# Patient Record
Sex: FEMALE | Race: WHITE | NOT HISPANIC OR LATINO | Employment: OTHER | URBAN - METROPOLITAN AREA
[De-identification: names, ages, dates, MRNs, and addresses within clinical notes are randomized per-mention and may not be internally consistent; named-entity substitution may affect disease eponyms.]

---

## 2023-09-13 PROBLEM — E78.00 ELEVATED CHOLESTEROL: Status: ACTIVE | Noted: 2023-09-13

## 2023-09-13 PROBLEM — R92.8 ABNORMAL MAMMOGRAM: Status: ACTIVE | Noted: 2023-09-13

## 2023-09-13 PROBLEM — S42.309A FRACTURE OF HUMERUS: Status: ACTIVE | Noted: 2023-09-13

## 2023-09-13 PROBLEM — I10 BENIGN ESSENTIAL HYPERTENSION: Status: ACTIVE | Noted: 2023-09-13

## 2023-09-13 PROBLEM — F41.8 DEPRESSION WITH ANXIETY: Status: ACTIVE | Noted: 2023-09-13

## 2023-09-13 PROBLEM — R73.03 PREDIABETES: Status: ACTIVE | Noted: 2023-09-13

## 2023-09-13 PROBLEM — M19.90 ARTHRITIS: Status: ACTIVE | Noted: 2023-09-13

## 2023-09-13 PROBLEM — E87.8 FLUID VOLUME DISORDER: Status: ACTIVE | Noted: 2023-09-13

## 2023-09-13 PROBLEM — S49.90XA INJURY OF UPPER EXTREMITY: Status: ACTIVE | Noted: 2023-09-13

## 2023-09-13 PROBLEM — R73.01 ELEVATED FASTING GLUCOSE: Status: ACTIVE | Noted: 2023-09-13

## 2023-09-13 RX ORDER — LYSINE HCL 500 MG
TABLET ORAL
COMMUNITY
Start: 2014-06-18 | End: 2024-05-14 | Stop reason: WASHOUT

## 2023-09-13 RX ORDER — NICOTINE POLACRILEX 2 MG
GUM BUCCAL
COMMUNITY
Start: 2014-06-18 | End: 2024-05-14 | Stop reason: WASHOUT

## 2023-09-13 RX ORDER — NAPROXEN SODIUM 220 MG
220 TABLET ORAL EVERY 12 HOURS
COMMUNITY

## 2023-09-13 RX ORDER — HYDROCODONE BITARTRATE AND ACETAMINOPHEN 5; 325 MG/1; MG/1
1-2 TABLET ORAL EVERY 4 HOURS PRN
COMMUNITY
End: 2024-05-14 | Stop reason: WASHOUT

## 2023-09-13 RX ORDER — LISINOPRIL AND HYDROCHLOROTHIAZIDE 10; 12.5 MG/1; MG/1
1 TABLET ORAL DAILY
COMMUNITY
Start: 2016-01-08 | End: 2023-11-06 | Stop reason: SDUPTHER

## 2023-09-13 RX ORDER — CITALOPRAM 20 MG/1
20 TABLET, FILM COATED ORAL DAILY
COMMUNITY
Start: 2013-02-07

## 2023-09-13 RX ORDER — LISINOPRIL AND HYDROCHLOROTHIAZIDE 12.5; 2 MG/1; MG/1
1 TABLET ORAL NIGHTLY
COMMUNITY
End: 2023-11-06 | Stop reason: ALTCHOICE

## 2023-09-13 RX ORDER — MELOXICAM 15 MG/1
15 TABLET ORAL DAILY
COMMUNITY
Start: 2015-07-10 | End: 2023-10-09 | Stop reason: ALTCHOICE

## 2023-09-13 RX ORDER — ACETAMINOPHEN, DIPHENHYDRAMINE HCL, PHENYLEPHRINE HCL 325; 25; 5 MG/1; MG/1; MG/1
TABLET ORAL
COMMUNITY
End: 2024-05-14 | Stop reason: WASHOUT

## 2023-09-13 RX ORDER — ATORVASTATIN CALCIUM 20 MG/1
20 TABLET, FILM COATED ORAL DAILY
COMMUNITY
Start: 2022-02-22 | End: 2023-10-09 | Stop reason: ALTCHOICE

## 2023-09-13 RX ORDER — DEXTROMETHORPHAN HYDROBROMIDE, GUAIFENESIN 5; 100 MG/5ML; MG/5ML
1300 LIQUID ORAL EVERY 8 HOURS PRN
COMMUNITY

## 2023-09-13 RX ORDER — TRIAMCINOLONE ACETONIDE 55 UG/1
2 SPRAY, METERED NASAL DAILY
COMMUNITY
Start: 2015-01-24

## 2023-10-04 ENCOUNTER — OFFICE VISIT (OUTPATIENT)
Dept: PRIMARY CARE | Facility: CLINIC | Age: 73
End: 2023-10-04
Payer: MEDICARE

## 2023-10-04 VITALS
SYSTOLIC BLOOD PRESSURE: 120 MMHG | OXYGEN SATURATION: 97 % | HEIGHT: 56 IN | RESPIRATION RATE: 18 BRPM | WEIGHT: 155 LBS | BODY MASS INDEX: 34.87 KG/M2 | HEART RATE: 85 BPM | TEMPERATURE: 97.9 F | DIASTOLIC BLOOD PRESSURE: 64 MMHG

## 2023-10-04 DIAGNOSIS — E78.00 ELEVATED CHOLESTEROL: ICD-10-CM

## 2023-10-04 DIAGNOSIS — J06.9 ACUTE URI: Primary | ICD-10-CM

## 2023-10-04 DIAGNOSIS — R73.03 PREDIABETES: ICD-10-CM

## 2023-10-04 DIAGNOSIS — I10 BENIGN ESSENTIAL HYPERTENSION: ICD-10-CM

## 2023-10-04 LAB — POC HEMOGLOBIN A1C: 5.4 % (ref 4.2–6.5)

## 2023-10-04 PROCEDURE — 99214 OFFICE O/P EST MOD 30 MIN: CPT | Performed by: FAMILY MEDICINE

## 2023-10-04 PROCEDURE — 1160F RVW MEDS BY RX/DR IN RCRD: CPT | Performed by: FAMILY MEDICINE

## 2023-10-04 PROCEDURE — 3074F SYST BP LT 130 MM HG: CPT | Performed by: FAMILY MEDICINE

## 2023-10-04 PROCEDURE — 1159F MED LIST DOCD IN RCRD: CPT | Performed by: FAMILY MEDICINE

## 2023-10-04 PROCEDURE — 3078F DIAST BP <80 MM HG: CPT | Performed by: FAMILY MEDICINE

## 2023-10-04 PROCEDURE — 1126F AMNT PAIN NOTED NONE PRSNT: CPT | Performed by: FAMILY MEDICINE

## 2023-10-04 PROCEDURE — 1036F TOBACCO NON-USER: CPT | Performed by: FAMILY MEDICINE

## 2023-10-04 RX ORDER — BENZONATATE 100 MG/1
100 CAPSULE ORAL 3 TIMES DAILY PRN
Qty: 30 CAPSULE | Refills: 0 | Status: SHIPPED | OUTPATIENT
Start: 2023-10-04 | End: 2023-10-14

## 2023-10-04 ASSESSMENT — PATIENT HEALTH QUESTIONNAIRE - PHQ9
SUM OF ALL RESPONSES TO PHQ9 QUESTIONS 1 & 2: 0
1. LITTLE INTEREST OR PLEASURE IN DOING THINGS: NOT AT ALL
2. FEELING DOWN, DEPRESSED OR HOPELESS: NOT AT ALL

## 2023-10-04 ASSESSMENT — PAIN SCALES - GENERAL: PAINLEVEL: 0-NO PAIN

## 2023-10-06 NOTE — PROGRESS NOTES
Subjective       Patient ID: Mackenzie Acosta is a 73 y.o. female here for f/u PreDM, HTN, and elevated cholesterol. Denies CP, SOB, or edema. Currently watching carbs. BS has not been checked lately. BP is good at home. She has had URI sx's x a month starting with sinus sx's then it went down into her chest with a dry cough all day. She developed laryngitis over the past two days. She went to  on the first day, 23, and they advised her it was viral and they recommended supportive care.    Active Ambulatory Problems     Diagnosis Date Noted    Abnormal mammogram 2023    Arthritis 2023    Benign essential hypertension 2023    Depression with anxiety 2023    Elevated cholesterol 2023    Elevated fasting glucose 2023    Fluid volume disorder 2023    Fracture of humerus 2023    Injury of upper extremity 2023    Prediabetes 2023     Resolved Ambulatory Problems     Diagnosis Date Noted    No Resolved Ambulatory Problems     Past Medical History:   Diagnosis Date    Anxiety     Cataract     Depression     Hypertension     Pre-diabetes        Past Surgical History:   Procedure Laterality Date    CATARACT EXTRACTION  2014    Cataract Surgery    HYSTEROSCOPY  2016    Hysteroscopy With Endometrial Ablation    OTHER SURGICAL HISTORY  2014    Vitrectomy Retinal Inspect: No Open Retinal Breaks / Detach    OTHER SURGICAL HISTORY  07/10/2015    Open Treatment Of Humeral Shaft Fracture With Plate/Screws    TONSILLECTOMY  2014    Tonsillectomy       No relevant family history has been documented for this patient.    She indicated that the status of her mother is unknown. She indicated that her father is . She indicated that her sister is . She indicated that one of her three brothers is .      Social History     Tobacco Use   Smoking Status Never   Smokeless Tobacco Never         Vitals:    10/04/23 0854   BP: 120/64  "  Pulse: 85   Resp: 18   Temp: 36.6 °C (97.9 °F)   SpO2: 97%   Weight: 70.3 kg (155 lb)   Height: 1.429 m (4' 8.25\")   PainSc: 0-No pain     Body mass index is 34.44 kg/m².    Objective     HEENT: normal pharynx, nares, sinuses, and TMs  Neck: no LAD  Lungs: CTA  Heart: RRR  Abdomen: NABS, soft, NT  Barefoot Extremities: no c,c,e, good pulses           POC HEMOGLOBIN A1c   Date Value Ref Range Status   10/04/2023 5.4 4.2 - 6.5 % Final         Assessment/Plan   Diagnoses and all orders for this visit:  Acute URI  -     benzonatate (Tessalon) 100 mg capsule; Take 1 capsule (100 mg) by mouth 3 times a day as needed for cough for up to 10 days. Do not crush or chew.  -     I can give a MDP if not improving. Consider ATBx if bacterial sx's develop  Prediabetes  -     POCT glycosylated hemoglobin (Hb A1C) manually resulted  -     Follow Up In Primary Care - Health Maintenance; Future  Elevated cholesterol  Benign essential hypertension                "

## 2023-10-09 ENCOUNTER — TELEMEDICINE (OUTPATIENT)
Dept: PRIMARY CARE | Facility: CLINIC | Age: 73
End: 2023-10-09
Payer: MEDICARE

## 2023-10-09 DIAGNOSIS — J20.8 ACUTE BRONCHITIS DUE TO OTHER SPECIFIED ORGANISMS: Primary | ICD-10-CM

## 2023-10-09 PROCEDURE — 99213 OFFICE O/P EST LOW 20 MIN: CPT | Performed by: FAMILY MEDICINE

## 2023-10-09 PROCEDURE — 99213 OFFICE O/P EST LOW 20 MIN: CPT | Mod: 95 | Performed by: FAMILY MEDICINE

## 2023-10-09 RX ORDER — METHYLPREDNISOLONE 4 MG/1
TABLET ORAL
Qty: 21 TABLET | Refills: 0 | Status: SHIPPED | OUTPATIENT
Start: 2023-10-09 | End: 2023-10-16

## 2023-10-09 RX ORDER — AZITHROMYCIN 250 MG/1
TABLET, FILM COATED ORAL
Qty: 6 TABLET | Refills: 0 | Status: SHIPPED | OUTPATIENT
Start: 2023-10-09 | End: 2024-05-14 | Stop reason: WASHOUT

## 2023-10-09 ASSESSMENT — ENCOUNTER SYMPTOMS
MYALGIAS: 0
RHINORRHEA: 0
FEVER: 0
WEIGHT LOSS: 0
SWEATS: 0
WHEEZING: 1
COUGH: 1
HEARTBURN: 0
SORE THROAT: 1
CHILLS: 0
HEADACHES: 0
HEMOPTYSIS: 0

## 2023-10-09 NOTE — PROGRESS NOTES
Subjective   With patient's permission, this is a Telemedicine visit with video and audio. The provider and patient participated in this telemedicine encounter.   This is 72 y/o WF on telehealth visit for URI sx's worsening after a month with yellow sputum and some wheezing.  The Tessalon perrles did not stop the cough.       Objective   There were no vitals taken for this visit.    Physical Exam    Looks good on video, no respiratory distress  But sounds congested still      Diagnoses and all orders for this visit:  Acute bronchitis due to other specified organisms  -     azithromycin (Zithromax) 250 mg tablet; Take 2 tablets (500 mg) by mouth once daily for 1 day, THEN 1 tablet (250 mg) once daily for 4 days. Take 2 tabs (500 mg) by mouth today, than 1 daily for 4 days..  -     methylPREDNISolone (Medrol Dospak) 4 mg tablets; Take as directed on package.    Answers submitted by the patient for this visit:  Cough Questionnaire (Submitted on 10/9/2023)  Chief Complaint: Cough  Chronicity: recurrent  Onset: more than 1 month ago  Progression since onset: rapidly worsening  Frequency: every few minutes  Cough characteristics: productive of sputum, productive of brown sputum  chest pain: No  chills: No  ear congestion: No  ear pain: No  fever: No  headaches: No  heartburn: No  hemoptysis: No  myalgias: No  nasal congestion: Yes  postnasal drip: Yes  rash: No  rhinorrhea: No  sore throat: Yes  sweats: No  weight loss: No  wheezing: Yes  Risk factors for lung disease: occupational exposure

## 2023-11-06 ENCOUNTER — TELEPHONE (OUTPATIENT)
Dept: PRIMARY CARE | Facility: CLINIC | Age: 73
End: 2023-11-06
Payer: MEDICARE

## 2023-11-06 DIAGNOSIS — I10 BENIGN ESSENTIAL HYPERTENSION: Primary | ICD-10-CM

## 2023-11-06 RX ORDER — LISINOPRIL AND HYDROCHLOROTHIAZIDE 10; 12.5 MG/1; MG/1
1 TABLET ORAL DAILY
Qty: 90 TABLET | Refills: 3 | Status: SHIPPED | OUTPATIENT
Start: 2023-11-06

## 2024-04-25 ENCOUNTER — APPOINTMENT (OUTPATIENT)
Dept: PRIMARY CARE | Facility: CLINIC | Age: 74
End: 2024-04-25
Payer: MEDICARE

## 2024-05-14 ENCOUNTER — OFFICE VISIT (OUTPATIENT)
Dept: PRIMARY CARE | Facility: CLINIC | Age: 74
End: 2024-05-14
Payer: MEDICARE

## 2024-05-14 VITALS
OXYGEN SATURATION: 99 % | HEART RATE: 74 BPM | HEIGHT: 59 IN | BODY MASS INDEX: 31.85 KG/M2 | TEMPERATURE: 98.7 F | SYSTOLIC BLOOD PRESSURE: 132 MMHG | WEIGHT: 158 LBS | DIASTOLIC BLOOD PRESSURE: 82 MMHG

## 2024-05-14 DIAGNOSIS — Z23 NEED FOR VACCINATION: ICD-10-CM

## 2024-05-14 DIAGNOSIS — M85.80 OSTEOPENIA AFTER MENOPAUSE: ICD-10-CM

## 2024-05-14 DIAGNOSIS — Z13.6 SCREENING FOR CARDIOVASCULAR CONDITION: ICD-10-CM

## 2024-05-14 DIAGNOSIS — Z13.1 DIABETES MELLITUS SCREENING: ICD-10-CM

## 2024-05-14 DIAGNOSIS — Z12.31 ENCOUNTER FOR SCREENING MAMMOGRAM FOR BREAST CANCER: ICD-10-CM

## 2024-05-14 DIAGNOSIS — Z00.00 ROUTINE GENERAL MEDICAL EXAMINATION AT HEALTH CARE FACILITY: ICD-10-CM

## 2024-05-14 DIAGNOSIS — R73.01 ABNORMAL FASTING GLUCOSE: ICD-10-CM

## 2024-05-14 DIAGNOSIS — Z78.0 OSTEOPENIA AFTER MENOPAUSE: ICD-10-CM

## 2024-05-14 DIAGNOSIS — Z00.00 ENCOUNTER FOR MEDICARE ANNUAL WELLNESS EXAM: Primary | ICD-10-CM

## 2024-05-14 PROCEDURE — 1170F FXNL STATUS ASSESSED: CPT | Performed by: STUDENT IN AN ORGANIZED HEALTH CARE EDUCATION/TRAINING PROGRAM

## 2024-05-14 PROCEDURE — 3075F SYST BP GE 130 - 139MM HG: CPT | Performed by: STUDENT IN AN ORGANIZED HEALTH CARE EDUCATION/TRAINING PROGRAM

## 2024-05-14 PROCEDURE — 1159F MED LIST DOCD IN RCRD: CPT | Performed by: STUDENT IN AN ORGANIZED HEALTH CARE EDUCATION/TRAINING PROGRAM

## 2024-05-14 PROCEDURE — 1036F TOBACCO NON-USER: CPT | Performed by: STUDENT IN AN ORGANIZED HEALTH CARE EDUCATION/TRAINING PROGRAM

## 2024-05-14 PROCEDURE — G0439 PPPS, SUBSEQ VISIT: HCPCS | Performed by: STUDENT IN AN ORGANIZED HEALTH CARE EDUCATION/TRAINING PROGRAM

## 2024-05-14 PROCEDURE — 3079F DIAST BP 80-89 MM HG: CPT | Performed by: STUDENT IN AN ORGANIZED HEALTH CARE EDUCATION/TRAINING PROGRAM

## 2024-05-14 ASSESSMENT — ENCOUNTER SYMPTOMS
CHILLS: 0
DEPRESSION: 0
NAUSEA: 0
PALPITATIONS: 0
FATIGUE: 0
CONSTIPATION: 0
RHINORRHEA: 0
SHORTNESS OF BREATH: 0
NERVOUS/ANXIOUS: 0
OCCASIONAL FEELINGS OF UNSTEADINESS: 0
ARTHRALGIAS: 0
DIARRHEA: 0
LOSS OF SENSATION IN FEET: 0
DYSPHORIC MOOD: 0
WHEEZING: 0
SINUS PRESSURE: 0
SINUS PAIN: 0
VOMITING: 0
MYALGIAS: 0
FEVER: 0
FREQUENCY: 0
COUGH: 0
WOUND: 0
DYSURIA: 0

## 2024-05-14 ASSESSMENT — ACTIVITIES OF DAILY LIVING (ADL)
BATHING: INDEPENDENT
DRESSING: INDEPENDENT
MANAGING_FINANCES: INDEPENDENT
GROCERY_SHOPPING: INDEPENDENT
DOING_HOUSEWORK: INDEPENDENT
TAKING_MEDICATION: INDEPENDENT

## 2024-05-14 ASSESSMENT — PATIENT HEALTH QUESTIONNAIRE - PHQ9
1. LITTLE INTEREST OR PLEASURE IN DOING THINGS: NOT AT ALL
2. FEELING DOWN, DEPRESSED OR HOPELESS: NOT AT ALL
SUM OF ALL RESPONSES TO PHQ9 QUESTIONS 1 AND 2: 0

## 2024-05-14 NOTE — PROGRESS NOTES
"Subjective   Reason for Visit: Mackenzie Acosta is an 73 y.o. female here for a Medicare Wellness visit.          Reviewed all medications by prescribing practitioner or clinical pharmacist (such as prescriptions, OTCs, herbal therapies and supplements) and documented in the medical record.    Here today for annual medicare visit.  No additional complaints today.          Patient Care Team:  Donna Vizcaino MD as PCP - General (Family Medicine)     Review of Systems   Constitutional:  Negative for chills, fatigue and fever.   HENT:  Negative for congestion, rhinorrhea, sinus pressure and sinus pain.    Respiratory:  Negative for cough, shortness of breath and wheezing.    Cardiovascular:  Negative for chest pain, palpitations and leg swelling.   Gastrointestinal:  Negative for constipation, diarrhea, nausea and vomiting.   Genitourinary:  Negative for dysuria, frequency and urgency.   Musculoskeletal:  Negative for arthralgias and myalgias.   Skin:  Negative for pallor, rash and wound.   Psychiatric/Behavioral:  Negative for dysphoric mood. The patient is not nervous/anxious.        Objective   Vitals:  /82   Pulse 74   Temp 37.1 °C (98.7 °F)   Ht 1.499 m (4' 11\")   Wt 71.7 kg (158 lb)   SpO2 99%   BMI 31.91 kg/m²       Physical Exam  Constitutional:       Appearance: Normal appearance.   HENT:      Head: Normocephalic and atraumatic.      Right Ear: External ear normal.      Left Ear: External ear normal.      Nose: Nose normal.      Mouth/Throat:      Mouth: Mucous membranes are moist.      Pharynx: Oropharynx is clear.   Eyes:      Extraocular Movements: Extraocular movements intact.      Conjunctiva/sclera: Conjunctivae normal.   Cardiovascular:      Rate and Rhythm: Normal rate and regular rhythm.      Pulses: Normal pulses.      Heart sounds: Normal heart sounds.   Pulmonary:      Effort: Pulmonary effort is normal.      Breath sounds: Normal breath sounds.   Abdominal:      General: There is no " distension.      Palpations: Abdomen is soft.   Skin:     General: Skin is warm and dry.   Neurological:      Mental Status: She is alert and oriented to person, place, and time.   Psychiatric:         Mood and Affect: Mood normal.         Behavior: Behavior normal.         Assessment/Plan   Problem List Items Addressed This Visit    None  Visit Diagnoses       Routine general medical examination at health care facility    -  Primary    Abnormal fasting glucose        Relevant Orders    Hemoglobin A1C    Diabetes mellitus screening        Relevant Orders    Comprehensive Metabolic Panel    Hemoglobin A1C    Screening for cardiovascular condition        Relevant Orders    Lipid panel    Need for vaccination        Relevant Medications    zoster vaccine-recombinant adjuvanted (Shingrix) 50 mcg/0.5 mL vaccine    Encounter for screening mammogram for breast cancer        Relevant Orders    BI mammo bilateral screening tomosynthesis    Osteopenia after menopause        Relevant Orders    XR DEXA bone density        Patient will need refills of lisinopril-hydrochlorothiazide and celexa once lab results are available.

## 2024-05-20 ENCOUNTER — HOSPITAL ENCOUNTER (EMERGENCY)
Facility: HOSPITAL | Age: 74
Discharge: HOME | End: 2024-05-20
Attending: EMERGENCY MEDICINE
Payer: MEDICARE

## 2024-05-20 VITALS
WEIGHT: 153.88 LBS | BODY MASS INDEX: 31.02 KG/M2 | OXYGEN SATURATION: 96 % | DIASTOLIC BLOOD PRESSURE: 79 MMHG | SYSTOLIC BLOOD PRESSURE: 156 MMHG | HEART RATE: 73 BPM | TEMPERATURE: 98.1 F | RESPIRATION RATE: 18 BRPM | HEIGHT: 59 IN

## 2024-05-20 DIAGNOSIS — S00.511A LIP ABRASION, INITIAL ENCOUNTER: ICD-10-CM

## 2024-05-20 DIAGNOSIS — S09.93XA DENTAL INJURY, INITIAL ENCOUNTER: ICD-10-CM

## 2024-05-20 DIAGNOSIS — S01.81XA FACIAL LACERATION, INITIAL ENCOUNTER: ICD-10-CM

## 2024-05-20 DIAGNOSIS — S09.90XA CLOSED HEAD INJURY, INITIAL ENCOUNTER: Primary | ICD-10-CM

## 2024-05-20 PROCEDURE — 90471 IMMUNIZATION ADMIN: CPT | Performed by: EMERGENCY MEDICINE

## 2024-05-20 PROCEDURE — 2500000004 HC RX 250 GENERAL PHARMACY W/ HCPCS (ALT 636 FOR OP/ED): Performed by: EMERGENCY MEDICINE

## 2024-05-20 PROCEDURE — 2500000005 HC RX 250 GENERAL PHARMACY W/O HCPCS: Performed by: EMERGENCY MEDICINE

## 2024-05-20 PROCEDURE — 90715 TDAP VACCINE 7 YRS/> IM: CPT | Performed by: EMERGENCY MEDICINE

## 2024-05-20 PROCEDURE — 12013 RPR F/E/E/N/L/M 2.6-5.0 CM: CPT

## 2024-05-20 PROCEDURE — 99282 EMERGENCY DEPT VISIT SF MDM: CPT | Mod: 25

## 2024-05-20 RX ORDER — LIDOCAINE HYDROCHLORIDE AND EPINEPHRINE 10; 10 MG/ML; UG/ML
5 INJECTION, SOLUTION INFILTRATION; PERINEURAL ONCE
Status: DISCONTINUED | OUTPATIENT
Start: 2024-05-20 | End: 2024-05-20

## 2024-05-20 RX ORDER — ACETAMINOPHEN 325 MG/1
975 TABLET ORAL ONCE
Status: COMPLETED | OUTPATIENT
Start: 2024-05-20 | End: 2024-05-20

## 2024-05-20 RX ADMIN — TETANUS TOXOID, REDUCED DIPHTHERIA TOXOID AND ACELLULAR PERTUSSIS VACCINE, ADSORBED 0.5 ML: 5; 2.5; 8; 8; 2.5 SUSPENSION INTRAMUSCULAR at 11:38

## 2024-05-20 RX ADMIN — ACETAMINOPHEN 975 MG: 325 TABLET ORAL at 11:38

## 2024-05-20 RX ADMIN — Medication 1 TUBE: at 11:15

## 2024-05-20 ASSESSMENT — PAIN - FUNCTIONAL ASSESSMENT
PAIN_FUNCTIONAL_ASSESSMENT: 0-10

## 2024-05-20 ASSESSMENT — COLUMBIA-SUICIDE SEVERITY RATING SCALE - C-SSRS
6. HAVE YOU EVER DONE ANYTHING, STARTED TO DO ANYTHING, OR PREPARED TO DO ANYTHING TO END YOUR LIFE?: NO
1. IN THE PAST MONTH, HAVE YOU WISHED YOU WERE DEAD OR WISHED YOU COULD GO TO SLEEP AND NOT WAKE UP?: NO
2. HAVE YOU ACTUALLY HAD ANY THOUGHTS OF KILLING YOURSELF?: NO

## 2024-05-20 ASSESSMENT — PAIN SCALES - GENERAL
PAINLEVEL_OUTOF10: 0 - NO PAIN
PAINLEVEL_OUTOF10: 0 - NO PAIN
PAINLEVEL_OUTOF10: 2

## 2024-05-20 ASSESSMENT — PAIN DESCRIPTION - LOCATION: LOCATION: HEAD

## 2024-05-20 NOTE — DISCHARGE INSTRUCTIONS
"After a head injury it is recommended that you rest your brain for 24 hours which means avoid \"screen time\" such as tv, computer, or phone and rest in a quiet space as much as possible. You should avoid any contact sports or other activities that put you at risk for a second head injury for at least one week or until all of your symptoms have resolved.  Over-the-counter medications as needed for pain.    Post-concussive syndrome can occur after head injury and includes headache, nausea, lightheadedness, difficulty concentrating, and many other symptoms that may last days to weeks, but most often resolve on their own.     There is a small percentage of patients who will have delayed bleeding in the brain after a head injury, especially if they are on blood thinner medications. This can develop in the first 12-24 hours after the injury.  If you develop any significant headaches, persistent vomiting, change in mental status, weakness in an arm or leg, confusion, or other neurologic symptoms, please return to the emergency department immediately for reevaluation.      Keep wound clean and dry.  May wash gently with soap and water after 24 hours and pat dry.  Skin glue will dissolve on its own.  Do not apply topical antibiotic ointment as this may cause premature breakdown of the skin glue. Return immediately if you develop increased pain, redness, or drainage from wound site or develop a fever as this could indication a wound infection.    Your tooth injury will need to see a dentist for follow-up.  "

## 2024-05-20 NOTE — ED PROVIDER NOTES
HPI   Chief Complaint   Patient presents with    Fall     I was walking on the sidewalk and tripped and hit my rt face on the sidewalk I have a 1 1/2 laceration over my rt eye brow and my teeth are loose I'm not on any blood thinners and did not lose consciousness        73-year-old female presents for mechanical trip and fall with closed head injury.  No loss of consciousness.  She is not on anticoagulants.  Denies any other areas of injury.  Has a laceration over her right eyebrow.  No vomiting or other symptoms since the injury.      History provided by:  Medical records                      [place] Coma Scale Score: 15                     Patient History   Past Medical History:   Diagnosis Date    Anxiety     Cataract     Depression     Hypertension     Pre-diabetes      Past Surgical History:   Procedure Laterality Date    CATARACT EXTRACTION  06/18/2014    Cataract Surgery    HYSTEROSCOPY  07/20/2016    Hysteroscopy With Endometrial Ablation    OTHER SURGICAL HISTORY  06/18/2014    Vitrectomy Retinal Inspect: No Open Retinal Breaks / Detach    OTHER SURGICAL HISTORY  07/10/2015    Open Treatment Of Humeral Shaft Fracture With Plate/Screws    TONSILLECTOMY  06/28/2014    Tonsillectomy     Family History   Problem Relation Name Age of Onset    Breast cancer Mother          x2    Atrial fibrillation Mother      Hypertension Mother      COPD Father      Other (gallbladder cancer) Father      Alcohol abuse Father      Heart attack Sister      Alcohol abuse Brother      PTSD Brother      Other (froze to death) Brother      Mental illness Brother      Mental illness Brother John      Social History     Tobacco Use    Smoking status: Never    Smokeless tobacco: Never   Vaping Use    Vaping status: Never Used   Substance Use Topics    Alcohol use: Not Currently     Alcohol/week: 1.0 standard drink of alcohol     Types: 1 Glasses of wine per week    Drug use: Never       Physical Exam   ED Triage Vitals [05/20/24  1027]   Temperature Heart Rate Respirations BP   36.7 °C (98.1 °F) 85 18 163/79      Pulse Ox Temp Source Heart Rate Source Patient Position   94 % Oral -- Sitting      BP Location FiO2 (%)     Left arm --       Physical Exam  Vitals and nursing note reviewed.     General: Vitals reviewed. Awake, alert, well-developed, well-nourished, NAD, airway patent  HEENT: NC superficial laceration 2.8 cm over the right eyebrow region, nongaping can be pulled apart slightly in the central portion, bleeding controlled otherwise well-approximated, PERRL, MMM, no facial contusions,  no bleeding per nares, septum midline, abrasion over the right superior lip with mild soft tissue swelling, right central upper incisor appears slightly displaced but intact with no looseness, no lacerations intraorally, no bony step-offs over the facial bones or maxillary ridge  Neck: Supple, trachea midline, no midline C-spine tenderness , step-offs , or deformities, no expanding hematomas, no stridor   Respiratory: No respiratory distress, lungs clear to auscultation bilaterally, no wheezes, rhonchi, or rales  CV: Regular rate and regular rhythm, no murmur/gallop/rubs  Abdomen/GI: Soft, non-tender, non-distended, no rebound, guarding, or rigidity, normal bowel sounds  Extremities/MSK : Moving all extremities, no deformities, no bony tenderness to palpation in the extremities and range of motion of all joints within normal limits, no midline TLS tenderness, step-offs , or deformities   Neuro: A/O x3, GCS 15, cranial nerves intact, no new focal motor or sensory deficits  Skin: Warm, dry. No rashes identified    ED Course & MDM   Diagnoses as of 05/20/24 1126   Closed head injury, initial encounter   Facial laceration, initial encounter   Lip abrasion, initial encounter   Dental injury, initial encounter       Medical Decision Making  73-year-old female presents for mechanical fall with closed head injury.  Based on history, physical exam although CT  brain was considered low suspicion for clinically significant head injury at this time do feel this imaging can be deferred.  I did have shared decision-making discussion with the patient and she agrees to defer at this time advised supportive care and monitoring of symptoms with strict return precautions.  She does have superficial laceration over right eyebrow not significantly gaping was readily repairable with Dermabond see procedure note for details.  She does have lip abrasion that can be treated with supportive care.  Given close head injury instructions as well as referral to dentist for tooth injury although no evidence of facial fracture.  Stable for outpatient management.        Procedure  Procedures     Ana Gandhi MD  05/20/24 1124

## 2024-05-20 NOTE — ED PROCEDURE NOTE
Procedure  Laceration Repair    Performed by: Ana Gandhi MD  Authorized by: Ana Gandhi MD    Consent:     Consent obtained:  Verbal    Consent given by:  Patient    Risks, benefits, and alternatives were discussed: yes      Risks discussed:  Infection, pain, poor cosmetic result, poor wound healing, nerve damage, need for additional repair, vascular damage and retained foreign body    Alternatives discussed:  Delayed treatment and no treatment  Universal protocol:     Patient identity confirmed:  Verbally with patient  Anesthesia:     Anesthesia method:  None  Laceration details:     Location:  Face    Face location:  R eyebrow    Length (cm):  2.8  Pre-procedure details:     Preparation:  Patient was prepped and draped in usual sterile fashion  Exploration:     Wound exploration: wound explored through full range of motion and entire depth of wound visualized      Wound extent: areolar tissue violated      Contaminated: no    Treatment:     Area cleansed with:  Soap and water    Amount of cleaning:  Standard    Irrigation solution:  Sterile saline    Irrigation method:  Syringe    Visualized foreign bodies/material removed: no      Debridement:  None    Undermining:  None  Skin repair:     Repair method:  Tissue adhesive  Approximation:     Approximation:  Close  Post-procedure details:     Procedure completion:  Tolerated well, no immediate complications               Ana Gandhi MD  05/20/24 1123

## 2024-06-10 ENCOUNTER — LAB (OUTPATIENT)
Dept: LAB | Facility: LAB | Age: 74
End: 2024-06-10
Payer: MEDICARE

## 2024-06-10 ENCOUNTER — HOSPITAL ENCOUNTER (OUTPATIENT)
Dept: RADIOLOGY | Facility: CLINIC | Age: 74
Discharge: HOME | End: 2024-06-10
Payer: MEDICARE

## 2024-06-10 VITALS — HEIGHT: 59 IN | WEIGHT: 153 LBS | BODY MASS INDEX: 30.84 KG/M2

## 2024-06-10 DIAGNOSIS — Z13.6 SCREENING FOR CARDIOVASCULAR CONDITION: ICD-10-CM

## 2024-06-10 DIAGNOSIS — M85.80 OSTEOPENIA AFTER MENOPAUSE: ICD-10-CM

## 2024-06-10 DIAGNOSIS — Z12.31 ENCOUNTER FOR SCREENING MAMMOGRAM FOR BREAST CANCER: ICD-10-CM

## 2024-06-10 DIAGNOSIS — Z78.0 OSTEOPENIA AFTER MENOPAUSE: ICD-10-CM

## 2024-06-10 DIAGNOSIS — Z13.1 DIABETES MELLITUS SCREENING: ICD-10-CM

## 2024-06-10 DIAGNOSIS — R73.01 ABNORMAL FASTING GLUCOSE: ICD-10-CM

## 2024-06-10 LAB
ALBUMIN SERPL-MCNC: 4.5 G/DL (ref 3.5–5)
ALP BLD-CCNC: 66 U/L (ref 35–125)
ALT SERPL-CCNC: 17 U/L (ref 5–40)
ANION GAP SERPL CALC-SCNC: 13 MMOL/L
AST SERPL-CCNC: 21 U/L (ref 5–40)
BILIRUB SERPL-MCNC: 0.4 MG/DL (ref 0.1–1.2)
BUN SERPL-MCNC: 14 MG/DL (ref 8–25)
CALCIUM SERPL-MCNC: 9.7 MG/DL (ref 8.5–10.4)
CHLORIDE SERPL-SCNC: 97 MMOL/L (ref 97–107)
CHOLEST SERPL-MCNC: 254 MG/DL (ref 133–200)
CHOLEST/HDLC SERPL: 5.3 {RATIO}
CO2 SERPL-SCNC: 26 MMOL/L (ref 24–31)
CREAT SERPL-MCNC: 0.9 MG/DL (ref 0.4–1.6)
EGFRCR SERPLBLD CKD-EPI 2021: 68 ML/MIN/1.73M*2
EST. AVERAGE GLUCOSE BLD GHB EST-MCNC: 123 MG/DL
GLUCOSE SERPL-MCNC: 107 MG/DL (ref 65–99)
HBA1C MFR BLD: 5.9 %
HDLC SERPL-MCNC: 48 MG/DL
LDLC SERPL CALC-MCNC: 175 MG/DL (ref 65–130)
POTASSIUM SERPL-SCNC: 4 MMOL/L (ref 3.4–5.1)
PROT SERPL-MCNC: 7.2 G/DL (ref 5.9–7.9)
SODIUM SERPL-SCNC: 136 MMOL/L (ref 133–145)
TRIGL SERPL-MCNC: 154 MG/DL (ref 40–150)

## 2024-06-10 PROCEDURE — 77067 SCR MAMMO BI INCL CAD: CPT

## 2024-06-10 PROCEDURE — 77067 SCR MAMMO BI INCL CAD: CPT | Performed by: RADIOLOGY

## 2024-06-10 PROCEDURE — 77080 DXA BONE DENSITY AXIAL: CPT | Performed by: RADIOLOGY

## 2024-06-10 PROCEDURE — 80053 COMPREHEN METABOLIC PANEL: CPT

## 2024-06-10 PROCEDURE — 77080 DXA BONE DENSITY AXIAL: CPT

## 2024-06-10 PROCEDURE — 80061 LIPID PANEL: CPT

## 2024-06-10 PROCEDURE — 77063 BREAST TOMOSYNTHESIS BI: CPT | Performed by: RADIOLOGY

## 2024-06-10 PROCEDURE — 36415 COLL VENOUS BLD VENIPUNCTURE: CPT

## 2024-06-10 PROCEDURE — 83036 HEMOGLOBIN GLYCOSYLATED A1C: CPT

## 2024-07-25 ENCOUNTER — OFFICE VISIT (OUTPATIENT)
Dept: PRIMARY CARE | Facility: CLINIC | Age: 74
End: 2024-07-25
Payer: MEDICARE

## 2024-07-25 VITALS
HEART RATE: 70 BPM | RESPIRATION RATE: 18 BRPM | TEMPERATURE: 97.5 F | SYSTOLIC BLOOD PRESSURE: 130 MMHG | BODY MASS INDEX: 31.39 KG/M2 | HEIGHT: 59 IN | WEIGHT: 155.7 LBS | DIASTOLIC BLOOD PRESSURE: 76 MMHG | OXYGEN SATURATION: 97 %

## 2024-07-25 DIAGNOSIS — R73.03 PREDIABETES: ICD-10-CM

## 2024-07-25 DIAGNOSIS — E78.00 ELEVATED CHOLESTEROL: ICD-10-CM

## 2024-07-25 DIAGNOSIS — F41.8 DEPRESSION WITH ANXIETY: ICD-10-CM

## 2024-07-25 DIAGNOSIS — E78.2 MIXED HYPERLIPIDEMIA: ICD-10-CM

## 2024-07-25 DIAGNOSIS — Z76.89 ENCOUNTER TO ESTABLISH CARE WITH NEW DOCTOR: Primary | ICD-10-CM

## 2024-07-25 DIAGNOSIS — I10 BENIGN ESSENTIAL HYPERTENSION: ICD-10-CM

## 2024-07-25 PROCEDURE — 99214 OFFICE O/P EST MOD 30 MIN: CPT | Performed by: FAMILY MEDICINE

## 2024-07-25 PROCEDURE — 3078F DIAST BP <80 MM HG: CPT | Performed by: FAMILY MEDICINE

## 2024-07-25 PROCEDURE — 1036F TOBACCO NON-USER: CPT | Performed by: FAMILY MEDICINE

## 2024-07-25 PROCEDURE — 3008F BODY MASS INDEX DOCD: CPT | Performed by: FAMILY MEDICINE

## 2024-07-25 PROCEDURE — 1125F AMNT PAIN NOTED PAIN PRSNT: CPT | Performed by: FAMILY MEDICINE

## 2024-07-25 PROCEDURE — 1159F MED LIST DOCD IN RCRD: CPT | Performed by: FAMILY MEDICINE

## 2024-07-25 PROCEDURE — 1160F RVW MEDS BY RX/DR IN RCRD: CPT | Performed by: FAMILY MEDICINE

## 2024-07-25 PROCEDURE — 3075F SYST BP GE 130 - 139MM HG: CPT | Performed by: FAMILY MEDICINE

## 2024-07-25 RX ORDER — CITALOPRAM 20 MG/1
20 TABLET, FILM COATED ORAL DAILY
Qty: 90 TABLET | Refills: 1 | Status: SHIPPED | OUTPATIENT
Start: 2024-07-25

## 2024-07-25 RX ORDER — LISINOPRIL AND HYDROCHLOROTHIAZIDE 10; 12.5 MG/1; MG/1
1 TABLET ORAL DAILY
Qty: 90 TABLET | Refills: 1 | Status: SHIPPED | OUTPATIENT
Start: 2024-07-25

## 2024-07-25 ASSESSMENT — COLUMBIA-SUICIDE SEVERITY RATING SCALE - C-SSRS
2. HAVE YOU ACTUALLY HAD ANY THOUGHTS OF KILLING YOURSELF?: NO
1. IN THE PAST MONTH, HAVE YOU WISHED YOU WERE DEAD OR WISHED YOU COULD GO TO SLEEP AND NOT WAKE UP?: NO
6. HAVE YOU EVER DONE ANYTHING, STARTED TO DO ANYTHING, OR PREPARED TO DO ANYTHING TO END YOUR LIFE?: NO

## 2024-07-25 ASSESSMENT — ANXIETY QUESTIONNAIRES
7. FEELING AFRAID AS IF SOMETHING AWFUL MIGHT HAPPEN: NOT AT ALL
4. TROUBLE RELAXING: MORE THAN HALF THE DAYS
5. BEING SO RESTLESS THAT IT IS HARD TO SIT STILL: NOT AT ALL
GAD7 TOTAL SCORE: 3
3. WORRYING TOO MUCH ABOUT DIFFERENT THINGS: NOT AT ALL
2. NOT BEING ABLE TO STOP OR CONTROL WORRYING: NOT AT ALL
6. BECOMING EASILY ANNOYED OR IRRITABLE: SEVERAL DAYS
IF YOU CHECKED OFF ANY PROBLEMS ON THIS QUESTIONNAIRE, HOW DIFFICULT HAVE THESE PROBLEMS MADE IT FOR YOU TO DO YOUR WORK, TAKE CARE OF THINGS AT HOME, OR GET ALONG WITH OTHER PEOPLE: NOT DIFFICULT AT ALL
1. FEELING NERVOUS, ANXIOUS, OR ON EDGE: NOT AT ALL

## 2024-07-25 ASSESSMENT — PATIENT HEALTH QUESTIONNAIRE - PHQ9
9. THOUGHTS THAT YOU WOULD BE BETTER OFF DEAD, OR OF HURTING YOURSELF: NOT AT ALL
7. TROUBLE CONCENTRATING ON THINGS, SUCH AS READING THE NEWSPAPER OR WATCHING TELEVISION: NOT AT ALL
3. TROUBLE FALLING OR STAYING ASLEEP: NOT AT ALL
SUM OF ALL RESPONSES TO PHQ9 QUESTIONS 1 & 2: 0
4. FEELING TIRED OR HAVING LITTLE ENERGY: NOT AT ALL
2. FEELING DOWN, DEPRESSED OR HOPELESS: NOT AT ALL
1. LITTLE INTEREST OR PLEASURE IN DOING THINGS: NOT AT ALL
8. MOVING OR SPEAKING SO SLOWLY THAT OTHER PEOPLE COULD HAVE NOTICED. OR THE OPPOSITE, BEING SO FIGETY OR RESTLESS THAT YOU HAVE BEEN MOVING AROUND A LOT MORE THAN USUAL: NOT AT ALL
1. LITTLE INTEREST OR PLEASURE IN DOING THINGS: NOT AT ALL
10. IF YOU CHECKED OFF ANY PROBLEMS, HOW DIFFICULT HAVE THESE PROBLEMS MADE IT FOR YOU TO DO YOUR WORK, TAKE CARE OF THINGS AT HOME, OR GET ALONG WITH OTHER PEOPLE: NOT DIFFICULT AT ALL
2. FEELING DOWN, DEPRESSED OR HOPELESS: NOT AT ALL
SUM OF ALL RESPONSES TO PHQ QUESTIONS 1-9: 0
SUM OF ALL RESPONSES TO PHQ9 QUESTIONS 1 & 2: 0
5. POOR APPETITE OR OVEREATING: NOT AT ALL
6. FEELING BAD ABOUT YOURSELF - OR THAT YOU ARE A FAILURE OR HAVE LET YOURSELF OR YOUR FAMILY DOWN: NOT AT ALL

## 2024-07-25 ASSESSMENT — ENCOUNTER SYMPTOMS
OCCASIONAL FEELINGS OF UNSTEADINESS: 0
DEPRESSION: 0
LOSS OF SENSATION IN FEET: 0

## 2024-07-25 ASSESSMENT — PAIN SCALES - GENERAL: PAINLEVEL: 4

## 2024-07-25 NOTE — PATIENT INSTRUCTIONS
"Problem List Items Addressed This Visit             ICD-10-CM    Benign essential hypertension I10    Depression with anxiety F41.8    Elevated cholesterol E78.00    Prediabetes R73.03     Other Visit Diagnoses         Codes    Encounter to establish care with new doctor    -  Primary Z76.89            Additional Visit Plans:  Plan to try \"pure EPA fishoil\" as a natural approach to bringing some numbers down. Good dosing would be 2000mg a day.     Can consider metamucil to help clear some bile and fat from the intestines. Low fat diet. Routine exercise as tolerated.    Price out the Shingles vaccine at the pharmacy.     Plan for coronary calcium score test for more info.     Next Wellness Exam/Annual Physical Due  May 2025    Patient Care Team:  Anushka Narayan MD as PCP - General (Family Medicine)  Donna Vizcaino MD as PCP - [employer] PCP    Sven Schulte,    07/25/24   2:13 PM     "

## 2024-07-25 NOTE — PROGRESS NOTES
Outpatient Visit Note    Chief Complaint   Patient presents with    medication check       HPI:  Mackenzie Acosta is a 73 y.o. female here to establish care and discuss her medications.    Previous PCP is Dr. Vizcaino.  She saw Dr. Narayan thereafter and had a Medicare wellness visit in May.    Recent A1c was 5.9%.  Kidneys and liver look fine.  LDL cholesterol 175 with elevated triglycerides and low HDL.  ASCVD risk 25%. She really does not want to add any statins now with everything she is dealing with.     She has hypertension for which she is on lisinopril-hydrochlorothiazide.  Does measure blood pressure readings at home (130s/8 but s). Denies any headache, blurry vision, nosebleeds, chest pain, shortness breath, dizziness or lower extremity edema.  No concerning side effects.     She has prediabetes, is not on a medicine for this.    History of elevated cholesterol.  Not on a medicine for this.    Takes Celexa for depression with anxiety.  Has good coverage without concerning side effects, no SI/HI.    Say she fell on May 20th at work. Had a large laceration over her right eye. Her teeth were misaligned and she had post concussive syndrome. Had oral surgery mid July. Dental work will be completed in November.     Takes naproxen for her dental pain. Gets this from the oral specialist.     PHQ9/GAD7:  Over the past 2 weeks, how often have you been bothered by any of the following problems?  Trouble falling or staying asleep, or sleeping too much: Not at all  Feeling tired or having little energy: Not at all  Poor appetite or overeating: Not at all  Feeling bad about yourself - or that you are a failure or have let yourself or your family down: Not at all  Trouble concentrating on things, such as reading the newspaper or watching television: Not at all  Moving or speaking so slowly that other people could have noticed? Or the opposite - being so fidgety or restless that you have been moving around a lot more  than usual.: Not at all  Thoughts that you would be better off dead or hurting yourself in some way: Not at all  Patient Health Questionnaire-9 Score: 0  Over the last 2 weeks, how often have you been bothered by any of the following problems?  Feeling nervous, anxious, or on edge: Not at all  Not being able to stop or control worrying: Not at all  Worrying too much about different things: Not at all  Trouble relaxing: More than half the days  Being so restless that it is hard to sit still: Not at all  Becoming easily annoyed or irritable: Several days  Feeling afraid as if something awful might happen: Not at all  KYARA-7 Total Score: 3      Patient Active Problem List    Diagnosis Date Noted    Abnormal mammogram 09/13/2023    Arthritis 09/13/2023    Benign essential hypertension 09/13/2023    Depression with anxiety 09/13/2023    Elevated cholesterol 09/13/2023    Elevated fasting glucose 09/13/2023    Fluid volume disorder 09/13/2023    Fracture of humerus 09/13/2023    Injury of upper extremity 09/13/2023    Prediabetes 09/13/2023        Past Medical History:   Diagnosis Date    Anxiety     Cataract     Depression     Hypertension     Pre-diabetes         Current Medications  Current Outpatient Medications   Medication Instructions    acetaminophen (TYLENOL 8 HOUR) 1,300 mg, oral, Every 8 hours PRN    citalopram (CELEXA) 20 mg, oral, Daily    lisinopriL-hydrochlorothiazide 10-12.5 mg tablet 1 tablet, oral, Daily    mv-mn/folic ac/calcium/vit K1 (WOMEN'S 50 PLUS MULTIVITAMIN ORAL) 1 tablet, oral, Daily    naproxen sodium (ALEVE) 220 mg, oral, Every 12 hours, With food or milk    NON FORMULARY 1 tablet, oral, Daily    triamcinolone (Nasacort) 55 mcg nasal inhaler 2 sprays, nasal, Daily        Allergies  Allergies   Allergen Reactions    Fexofenadine Other     vertigo    Fluticasone Propionate Headache    Phenytoin Other     Elevated BP        Past Surgical History:   Procedure Laterality Date    CATARACT EXTRACTION   06/18/2014    Cataract Surgery    HYSTEROSCOPY  07/20/2016    Hysteroscopy With Endometrial Ablation    OTHER SURGICAL HISTORY  06/18/2014    Vitrectomy Retinal Inspect: No Open Retinal Breaks / Detach    OTHER SURGICAL HISTORY  07/10/2015    Open Treatment Of Humeral Shaft Fracture With Plate/Screws    TONSILLECTOMY  06/28/2014    Tonsillectomy     Family History   Problem Relation Name Age of Onset    Breast cancer Mother  45        x2    Atrial fibrillation Mother      Hypertension Mother      COPD Father      Other (gallbladder cancer) Father      Alcohol abuse Father      Heart attack Sister      Alcohol abuse Brother      PTSD Brother      Other (froze to death) Brother      Mental illness Brother      Mental illness Brother John      Social History     Tobacco Use    Smoking status: Never    Smokeless tobacco: Never   Vaping Use    Vaping status: Never Used   Substance Use Topics    Alcohol use: Not Currently     Alcohol/week: 1.0 standard drink of alcohol     Types: 1 Glasses of wine per week    Drug use: Never     Tobacco Use: Low Risk  (7/25/2024)    Patient History     Smoking Tobacco Use: Never     Smokeless Tobacco Use: Never     Passive Exposure: Not on file        ROS  All pertinent positive symptoms are included in the history of present illness.  All other systems have been reviewed and are negative and noncontributory to this patient's current ailments.    VITAL SIGNS  Vitals:    07/25/24 1349   BP: 130/76   Pulse: 70   Resp: 18   Temp: 36.4 °C (97.5 °F)   SpO2: 97%     Vitals:    07/25/24 1349   Weight: 70.6 kg (155 lb 11.2 oz)      Body mass index is 31.45 kg/m².     PHYSICAL EXAM  GENERAL APPEARANCE: well nourished, well developed, looks like stated age, in no acute distress, not ill or tired appearing, conversing well.   HEENT: no trauma, normocephalic.   NECK: no nodes, supple without rigidity, no neck mass was observed  HEART: regular rate and rhythm, S1 and S2 heard with no murmurs or  "skipped beats. No carotid bruits.  LUNGS: clear to auscultation bilaterally with no wheezes, crackles or rales.   EXTREMITIES: moving all extremities equally with no edema or deformities.   SKIN: normal skin color and pigmentation, normal skin turgor without rash, lesions, or nodules visualized.   NEUROLOGIC EXAM: CN II-XII grossly intact, normal gait, normal balance.   PSYCH: mood and affect appropriate; alert and oriented to time, place, person; no difficulty with speech or language.     Counseling:       Medication education:           Education:  The patient is counseled regarding potential side-effects of all new medications          Understanding:  Patient expressed understanding of information conveyed today          Adherence:  No barriers to adherence identified     Assessment/Plan   Problem List Items Addressed This Visit             ICD-10-CM    Benign essential hypertension I10    Depression with anxiety F41.8    Elevated cholesterol E78.00    Prediabetes R73.03     Other Visit Diagnoses         Codes    Encounter to establish care with new doctor    -  Primary Z76.89            Additional Visit Plans:  Plan to try \"pure EPA fishoil\" as a natural approach to bringing some numbers down. Good dosing would be 2000mg a day.     Can consider metamucil to help clear some bile and fat from the intestines. Low fat diet. Routine exercise as tolerated.    Price out the Shingles vaccine at the pharmacy.     Plan for coronary calcium score test for more info.     Next Wellness Exam/Annual Physical Due  May 2025    Patient Care Team:  Anushka Narayan MD as PCP - General (Family Medicine)  Donna Vizcaino MD as PCP - [employer] PCP    Sven Schulte, DO   07/25/24   2:13 PM   "

## 2024-11-05 ENCOUNTER — TELEPHONE (OUTPATIENT)
Dept: PRIMARY CARE | Facility: CLINIC | Age: 74
End: 2024-11-05
Payer: MEDICARE

## 2024-11-05 NOTE — TELEPHONE ENCOUNTER
Mackenzie called stating she is scheduled on 11-07-24 for a sick appointment . She called stating she took a Covid test today and she tested negative for Covid.

## 2024-11-07 ENCOUNTER — OFFICE VISIT (OUTPATIENT)
Dept: PRIMARY CARE | Facility: CLINIC | Age: 74
End: 2024-11-07
Payer: MEDICARE

## 2024-11-07 VITALS
OXYGEN SATURATION: 97 % | HEIGHT: 59 IN | TEMPERATURE: 96.9 F | HEART RATE: 76 BPM | SYSTOLIC BLOOD PRESSURE: 104 MMHG | WEIGHT: 156 LBS | BODY MASS INDEX: 31.45 KG/M2 | DIASTOLIC BLOOD PRESSURE: 64 MMHG

## 2024-11-07 DIAGNOSIS — J06.9 ACUTE URI: Primary | ICD-10-CM

## 2024-11-07 PROCEDURE — 3074F SYST BP LT 130 MM HG: CPT | Performed by: FAMILY MEDICINE

## 2024-11-07 PROCEDURE — 99214 OFFICE O/P EST MOD 30 MIN: CPT | Performed by: FAMILY MEDICINE

## 2024-11-07 PROCEDURE — 1160F RVW MEDS BY RX/DR IN RCRD: CPT | Performed by: FAMILY MEDICINE

## 2024-11-07 PROCEDURE — 1036F TOBACCO NON-USER: CPT | Performed by: FAMILY MEDICINE

## 2024-11-07 PROCEDURE — 1159F MED LIST DOCD IN RCRD: CPT | Performed by: FAMILY MEDICINE

## 2024-11-07 PROCEDURE — 1126F AMNT PAIN NOTED NONE PRSNT: CPT | Performed by: FAMILY MEDICINE

## 2024-11-07 PROCEDURE — 3008F BODY MASS INDEX DOCD: CPT | Performed by: FAMILY MEDICINE

## 2024-11-07 PROCEDURE — 3078F DIAST BP <80 MM HG: CPT | Performed by: FAMILY MEDICINE

## 2024-11-07 RX ORDER — CHOLECALCIFEROL (VITAMIN D3) 50 MCG
50 TABLET ORAL DAILY
COMMUNITY

## 2024-11-07 RX ORDER — BENZONATATE 200 MG/1
200 CAPSULE ORAL 3 TIMES DAILY PRN
Qty: 30 CAPSULE | Refills: 0 | Status: SHIPPED | OUTPATIENT
Start: 2024-11-07 | End: 2024-12-07

## 2024-11-07 RX ORDER — AMOXICILLIN AND CLAVULANATE POTASSIUM 875; 125 MG/1; MG/1
875 TABLET, FILM COATED ORAL 2 TIMES DAILY
Qty: 14 TABLET | Refills: 0 | Status: SHIPPED | OUTPATIENT
Start: 2024-11-07 | End: 2024-11-14

## 2024-11-07 RX ORDER — PSYLLIUM HUSK 0.4 G
600 CAPSULE ORAL
COMMUNITY

## 2024-11-07 RX ORDER — GLUCOSAM/CHONDRO/HERB 149/HYAL 750-100 MG
TABLET ORAL
COMMUNITY

## 2024-11-07 ASSESSMENT — PATIENT HEALTH QUESTIONNAIRE - PHQ9
1. LITTLE INTEREST OR PLEASURE IN DOING THINGS: NOT AT ALL
SUM OF ALL RESPONSES TO PHQ9 QUESTIONS 1 AND 2: 0
2. FEELING DOWN, DEPRESSED OR HOPELESS: NOT AT ALL

## 2024-11-07 ASSESSMENT — PAIN SCALES - GENERAL: PAINLEVEL_OUTOF10: 0-NO PAIN

## 2024-11-07 NOTE — PATIENT INSTRUCTIONS
Problem List Items Addressed This Visit             ICD-10-CM    Acute URI - Primary J06.9     - Given your symptoms and duration of illness, we feel that you can benefit from antibiotic coverage at this time   - A prescription for Augmentin was sent to your pharmacy, please take this medication as prescribed  - Maximum strength Tessalon Perles additionally sent to help with persistent cough while  - Recommend supportive care with increased fluid intake to thin secretions, Ibuprofen or Tylenol as needed for pain or fever, and steamy showers/saline nasal rinses to help clear the nasal passages   - You may consider tea with honey or a cinnamon stick as these have natural antiviral and antibiotic properties   - Call if symptoms worsen or do not improve with these treatments         Relevant Medications    amoxicillin-pot clavulanate (Augmentin) 875-125 mg tablet    benzonatate (Tessalon) 200 mg capsule       Counseling:       Medication education:         Education:  The patient is counseled regarding potential side-effects of all new medications        Understanding:  Patient expressed understanding        Adherence:  No barriers to adherence identified    ** Please excuse any errors in grammar or translation related to this dictation. Voice recognition software was utilized to prepare this document. **

## 2024-11-07 NOTE — ASSESSMENT & PLAN NOTE
- Given your symptoms and duration of illness, we feel that you can benefit from antibiotic coverage at this time   - A prescription for Augmentin was sent to your pharmacy, please take this medication as prescribed  - Maximum strength Tessalon Perles additionally sent to help with persistent cough while  - Recommend supportive care with increased fluid intake to thin secretions, Ibuprofen or Tylenol as needed for pain or fever, and steamy showers/saline nasal rinses to help clear the nasal passages   - You may consider tea with honey or a cinnamon stick as these have natural antiviral and antibiotic properties   - Call if symptoms worsen or do not improve with these treatments

## 2024-11-14 ENCOUNTER — TELEPHONE (OUTPATIENT)
Dept: PRIMARY CARE | Facility: CLINIC | Age: 74
End: 2024-11-14
Payer: MEDICARE

## 2024-11-14 NOTE — TELEPHONE ENCOUNTER
This is a previous Dr. Narayan and Dr. Schulte patient.   Patient needs a new PCP and we were advised by administration to schedule the patient with Dr. Conte.  Please call and schedule. Thank you!

## 2024-12-18 DIAGNOSIS — F41.8 DEPRESSION WITH ANXIETY: ICD-10-CM

## 2024-12-20 RX ORDER — CITALOPRAM 20 MG/1
20 TABLET, FILM COATED ORAL DAILY
Qty: 90 TABLET | Refills: 0 | Status: SHIPPED | OUTPATIENT
Start: 2024-12-20

## 2025-01-16 ENCOUNTER — APPOINTMENT (OUTPATIENT)
Dept: PRIMARY CARE | Facility: CLINIC | Age: 75
End: 2025-01-16
Payer: MEDICARE

## 2025-01-16 VITALS
BODY MASS INDEX: 29.08 KG/M2 | WEIGHT: 158 LBS | HEART RATE: 75 BPM | DIASTOLIC BLOOD PRESSURE: 80 MMHG | OXYGEN SATURATION: 95 % | HEIGHT: 62 IN | SYSTOLIC BLOOD PRESSURE: 124 MMHG

## 2025-01-16 DIAGNOSIS — R73.03 PREDIABETES: ICD-10-CM

## 2025-01-16 DIAGNOSIS — F41.8 DEPRESSION WITH ANXIETY: ICD-10-CM

## 2025-01-16 DIAGNOSIS — I10 BENIGN ESSENTIAL HYPERTENSION: Primary | ICD-10-CM

## 2025-01-16 LAB — POC HEMOGLOBIN A1C: 5.8 % (ref 4.2–6.5)

## 2025-01-16 PROCEDURE — 99214 OFFICE O/P EST MOD 30 MIN: CPT

## 2025-01-16 PROCEDURE — 83036 HEMOGLOBIN GLYCOSYLATED A1C: CPT

## 2025-01-16 PROCEDURE — 3008F BODY MASS INDEX DOCD: CPT

## 2025-01-16 PROCEDURE — 1159F MED LIST DOCD IN RCRD: CPT

## 2025-01-16 PROCEDURE — 3079F DIAST BP 80-89 MM HG: CPT

## 2025-01-16 PROCEDURE — 3074F SYST BP LT 130 MM HG: CPT

## 2025-01-16 RX ORDER — LISINOPRIL AND HYDROCHLOROTHIAZIDE 10; 12.5 MG/1; MG/1
1 TABLET ORAL DAILY
Qty: 90 TABLET | Refills: 1 | Status: SHIPPED | OUTPATIENT
Start: 2025-01-16

## 2025-01-16 RX ORDER — CITALOPRAM 20 MG/1
20 TABLET, FILM COATED ORAL DAILY
Qty: 90 TABLET | Refills: 1 | Status: SHIPPED | OUTPATIENT
Start: 2025-01-16

## 2025-01-16 ASSESSMENT — PATIENT HEALTH QUESTIONNAIRE - PHQ9
SUM OF ALL RESPONSES TO PHQ9 QUESTIONS 1 AND 2: 0
2. FEELING DOWN, DEPRESSED OR HOPELESS: NOT AT ALL
1. LITTLE INTEREST OR PLEASURE IN DOING THINGS: NOT AT ALL

## 2025-01-16 NOTE — PROGRESS NOTES
Chief Complaint Mackenzie Acosta is a 74 y.o. female who presents to establish care and talk about multiple complaints    HPI:  Pt states that she currently lives with and takes care of her 97 year old mother and 50 year old special need son. Patient states that she does not get a lot of help from her family to help take care of her mother and son so a lot of the responsibility falls onto her and she often does not get a break. Patient states that she has not felt well in a long time due to falling at work when she stepped off the sidewalk and hit her jaw and knocked teeth out on concrete. Patient has had multiple dental surgeries and has been fighting an infection but at her last dentist appointment they found more infection and do not know where it is coming from so recommended her to see an ENT. Patient also endorses a history of hypertension which she says is controlled on her current regimen. Takes fish oil for her cholesterol. Patient states that she swings between prediabetic and it has been a while since her blood sugar was checked.      ROS:  Review of Systems   Constitutional:  Negative for chills, fatigue and fever.   HENT:  Negative for congestion, ear pain and rhinorrhea.    Eyes:  Negative for visual disturbance.   Respiratory:  Negative for cough, chest tightness, shortness of breath and wheezing.    Cardiovascular:  Negative for chest pain, palpitations and leg swelling.   Gastrointestinal:  Negative for abdominal pain, blood in stool, constipation and diarrhea.   Genitourinary:  Negative for dysuria, frequency, hematuria and urgency.   Musculoskeletal:  Negative for joint swelling.   Skin:  Negative for rash.   Neurological:  Negative for syncope, weakness, numbness and headaches.   Psychiatric/Behavioral:  The patient is not nervous/anxious.        Meds:    Current Outpatient Medications:     acetaminophen (Tylenol 8 Hour) 650 mg ER tablet, Take 2 tablets (1,300 mg) by mouth every 8 hours if  "needed., Disp: , Rfl:     calcium carbonate 600 mg calcium (1,500 mg) tablet, Take 600 mg by mouth 2 times daily (morning and late afternoon)., Disp: , Rfl:     cholecalciferol (Vitamin D3) 50 MCG (2000 UT) tablet, Take 1 tablet (50 mcg) by mouth once daily., Disp: , Rfl:     mv-mn/folic ac/calcium/vit K1 (WOMEN'S 50 PLUS MULTIVITAMIN ORAL), Take 1 tablet by mouth once daily., Disp: , Rfl:     naproxen sodium (Aleve) 220 mg tablet, Take 1 tablet (220 mg) by mouth every 12 hours. With food or milk, Disp: , Rfl:     omega 3-dha-epa-fish oil (Fish OiL) 1,000 (120-180) mg capsule, Take by mouth., Disp: , Rfl:     citalopram (CeleXA) 20 mg tablet, Take 1 tablet (20 mg) by mouth once daily., Disp: 90 tablet, Rfl: 1    lisinopriL-hydrochlorothiazide 10-12.5 mg tablet, Take 1 tablet by mouth once daily., Disp: 90 tablet, Rfl: 1    Allergies:  Allergies   Allergen Reactions    Fexofenadine Other     vertigo    Fluticasone Propionate Headache    Phenytoin Other     Elevated BP       PE:  Visit Vitals  /80   Pulse 75   Ht 1.575 m (5' 2\")   Wt 71.7 kg (158 lb)   SpO2 95%   BMI 28.90 kg/m²   OB Status Postmenopausal   Smoking Status Never   BSA 1.77 m²     Physical Exam  Constitutional:       Appearance: Normal appearance.   HENT:      Head: Normocephalic and atraumatic.   Eyes:      Extraocular Movements: Extraocular movements intact.      Pupils: Pupils are equal, round, and reactive to light.   Cardiovascular:      Rate and Rhythm: Normal rate and regular rhythm.   Pulmonary:      Effort: Pulmonary effort is normal.      Breath sounds: Normal breath sounds.   Abdominal:      General: There is no distension.      Palpations: Abdomen is soft.      Tenderness: There is no abdominal tenderness. There is no guarding or rebound.   Musculoskeletal:         General: Normal range of motion.   Skin:     General: Skin is warm and dry.      Capillary Refill: Capillary refill takes less than 2 seconds.   Neurological:      General: " No focal deficit present.      Mental Status: She is alert and oriented to person, place, and time. Mental status is at baseline.   Psychiatric:         Mood and Affect: Mood normal.         Behavior: Behavior normal.         Thought Content: Thought content normal.         Judgment: Judgment normal.           Assessment/Plan  Mackenzie Acosta is a 74 y.o. female who presents to [facility] with multiple complaints. Patients hypertension is well controlled on the current medication regimen. Will continue the regimen at this time. POCT Hgb A1C was completed and was 5.8. Patient is prediabetic. Recommended to continue lifestyles changes at this time. Patient was also given a refill on her celexa for depression and anxiety which seems to be moderately controlled at this time.    Mackenzie was seen today for [facility].  Diagnoses and all orders for this visit:  Benign essential hypertension (Primary)  -     lisinopriL-hydrochlorothiazide 10-12.5 mg tablet; Take 1 tablet by mouth once daily.  Prediabetes  -     POCT glycosylated hemoglobin (Hb A1C) manually resulted  Depression with anxiety  -     citalopram (CeleXA) 20 mg tablet; Take 1 tablet (20 mg) by mouth once daily.         Follow up in 6 months for annual medicare wellness.    Patient was staffed with Dr. Soren Miramontes DO, PGY-3  [facility]

## 2025-01-19 ASSESSMENT — ENCOUNTER SYMPTOMS
DIARRHEA: 0
FREQUENCY: 0
JOINT SWELLING: 0
SHORTNESS OF BREATH: 0
HEMATURIA: 0
FEVER: 0
BLOOD IN STOOL: 0
WEAKNESS: 0
DYSURIA: 0
CHEST TIGHTNESS: 0
HEADACHES: 0
RHINORRHEA: 0
COUGH: 0
CONSTIPATION: 0
ABDOMINAL PAIN: 0
PALPITATIONS: 0
WHEEZING: 0
FATIGUE: 0
CHILLS: 0
NERVOUS/ANXIOUS: 0
NUMBNESS: 0

## 2025-06-09 ENCOUNTER — TELEPHONE (OUTPATIENT)
Facility: CLINIC | Age: 75
End: 2025-06-09
Payer: MEDICARE

## 2025-06-09 DIAGNOSIS — E78.00 ELEVATED CHOLESTEROL: ICD-10-CM

## 2025-06-09 DIAGNOSIS — R73.03 PREDIABETES: ICD-10-CM

## 2025-06-09 DIAGNOSIS — I10 BENIGN ESSENTIAL HYPERTENSION: Primary | ICD-10-CM

## 2025-06-10 ENCOUNTER — APPOINTMENT (OUTPATIENT)
Dept: PRIMARY CARE | Facility: CLINIC | Age: 75
End: 2025-06-10
Payer: MEDICARE

## 2025-06-13 ENCOUNTER — TELEPHONE (OUTPATIENT)
Facility: CLINIC | Age: 75
End: 2025-06-13
Payer: MEDICARE

## 2025-06-13 NOTE — TELEPHONE ENCOUNTER
Patient states that Dr. Schulte told her to have thyroid testing done for  lump on her thyroid. Please advise.

## 2025-06-14 LAB
ALBUMIN SERPL-MCNC: 4.6 G/DL (ref 3.6–5.1)
ALP SERPL-CCNC: 53 U/L (ref 37–153)
ALT SERPL-CCNC: 24 U/L (ref 6–29)
ANION GAP SERPL CALCULATED.4IONS-SCNC: 11 MMOL/L (CALC) (ref 7–17)
AST SERPL-CCNC: 25 U/L (ref 10–35)
BASOPHILS # BLD AUTO: 58 CELLS/UL (ref 0–200)
BASOPHILS NFR BLD AUTO: 0.8 %
BILIRUB SERPL-MCNC: 0.6 MG/DL (ref 0.2–1.2)
BUN SERPL-MCNC: 19 MG/DL (ref 7–25)
CALCIUM SERPL-MCNC: 10.2 MG/DL (ref 8.6–10.4)
CHLORIDE SERPL-SCNC: 95 MMOL/L (ref 98–110)
CHOLEST SERPL-MCNC: 242 MG/DL
CHOLEST/HDLC SERPL: 4.8 (CALC)
CO2 SERPL-SCNC: 28 MMOL/L (ref 20–32)
CREAT SERPL-MCNC: 0.83 MG/DL (ref 0.6–1)
EGFRCR SERPLBLD CKD-EPI 2021: 74 ML/MIN/1.73M2
EOSINOPHIL # BLD AUTO: 130 CELLS/UL (ref 15–500)
EOSINOPHIL NFR BLD AUTO: 1.8 %
ERYTHROCYTE [DISTWIDTH] IN BLOOD BY AUTOMATED COUNT: 12.1 % (ref 11–15)
EST. AVERAGE GLUCOSE BLD GHB EST-MCNC: 126 MG/DL
EST. AVERAGE GLUCOSE BLD GHB EST-SCNC: 7 MMOL/L
GLUCOSE SERPL-MCNC: 123 MG/DL (ref 65–99)
HBA1C MFR BLD: 6 %
HCT VFR BLD AUTO: 40.8 % (ref 35–45)
HDLC SERPL-MCNC: 50 MG/DL
HGB BLD-MCNC: 13.7 G/DL (ref 11.7–15.5)
LDLC SERPL CALC-MCNC: 153 MG/DL (CALC)
LYMPHOCYTES # BLD AUTO: 2902 CELLS/UL (ref 850–3900)
LYMPHOCYTES NFR BLD AUTO: 40.3 %
MCH RBC QN AUTO: 32.2 PG (ref 27–33)
MCHC RBC AUTO-ENTMCNC: 33.6 G/DL (ref 32–36)
MCV RBC AUTO: 95.8 FL (ref 80–100)
MONOCYTES # BLD AUTO: 713 CELLS/UL (ref 200–950)
MONOCYTES NFR BLD AUTO: 9.9 %
NEUTROPHILS # BLD AUTO: 3398 CELLS/UL (ref 1500–7800)
NEUTROPHILS NFR BLD AUTO: 47.2 %
NONHDLC SERPL-MCNC: 192 MG/DL (CALC)
PLATELET # BLD AUTO: 307 THOUSAND/UL (ref 140–400)
PMV BLD REES-ECKER: 9.5 FL (ref 7.5–12.5)
POTASSIUM SERPL-SCNC: 4.1 MMOL/L (ref 3.5–5.3)
PROT SERPL-MCNC: 7.3 G/DL (ref 6.1–8.1)
RBC # BLD AUTO: 4.26 MILLION/UL (ref 3.8–5.1)
SODIUM SERPL-SCNC: 134 MMOL/L (ref 135–146)
TRIGL SERPL-MCNC: 218 MG/DL
WBC # BLD AUTO: 7.2 THOUSAND/UL (ref 3.8–10.8)

## 2025-06-16 ENCOUNTER — APPOINTMENT (OUTPATIENT)
Dept: PRIMARY CARE | Facility: CLINIC | Age: 75
End: 2025-06-16
Payer: MEDICARE

## 2025-06-16 VITALS
WEIGHT: 156 LBS | BODY MASS INDEX: 31.45 KG/M2 | SYSTOLIC BLOOD PRESSURE: 122 MMHG | HEIGHT: 59 IN | DIASTOLIC BLOOD PRESSURE: 80 MMHG | OXYGEN SATURATION: 97 % | HEART RATE: 78 BPM

## 2025-06-16 DIAGNOSIS — R73.03 PREDIABETES: ICD-10-CM

## 2025-06-16 DIAGNOSIS — F41.8 DEPRESSION WITH ANXIETY: ICD-10-CM

## 2025-06-16 DIAGNOSIS — I10 BENIGN ESSENTIAL HYPERTENSION: ICD-10-CM

## 2025-06-16 DIAGNOSIS — E78.2 MIXED HYPERLIPIDEMIA: ICD-10-CM

## 2025-06-16 DIAGNOSIS — Z12.11 COLON CANCER SCREENING: ICD-10-CM

## 2025-06-16 DIAGNOSIS — E01.0 THYROMEGALY: ICD-10-CM

## 2025-06-16 DIAGNOSIS — Z00.00 ROUTINE GENERAL MEDICAL EXAMINATION AT HEALTH CARE FACILITY: Primary | ICD-10-CM

## 2025-06-16 RX ORDER — CITALOPRAM 20 MG/1
20 TABLET ORAL DAILY
Qty: 90 TABLET | Refills: 1 | Status: SHIPPED | OUTPATIENT
Start: 2025-06-16

## 2025-06-16 RX ORDER — LISINOPRIL AND HYDROCHLOROTHIAZIDE 10; 12.5 MG/1; MG/1
1 TABLET ORAL DAILY
Qty: 90 TABLET | Refills: 1 | Status: SHIPPED | OUTPATIENT
Start: 2025-06-16

## 2025-06-16 ASSESSMENT — ACTIVITIES OF DAILY LIVING (ADL)
DRESSING: INDEPENDENT
BATHING: INDEPENDENT
MANAGING_FINANCES: INDEPENDENT
DOING_HOUSEWORK: INDEPENDENT
GROCERY_SHOPPING: INDEPENDENT
TAKING_MEDICATION: INDEPENDENT

## 2025-06-16 ASSESSMENT — PATIENT HEALTH QUESTIONNAIRE - PHQ9
2. FEELING DOWN, DEPRESSED OR HOPELESS: NOT AT ALL
1. LITTLE INTEREST OR PLEASURE IN DOING THINGS: NOT AT ALL
SUM OF ALL RESPONSES TO PHQ9 QUESTIONS 1 AND 2: 0

## 2025-06-19 ENCOUNTER — HOSPITAL ENCOUNTER (OUTPATIENT)
Dept: RADIOLOGY | Facility: CLINIC | Age: 75
Discharge: HOME | End: 2025-06-19
Payer: MEDICARE

## 2025-06-19 DIAGNOSIS — E01.0 THYROMEGALY: ICD-10-CM

## 2025-06-19 PROCEDURE — 76536 US EXAM OF HEAD AND NECK: CPT

## 2025-06-23 ENCOUNTER — HOSPITAL ENCOUNTER (OUTPATIENT)
Dept: RADIOLOGY | Facility: CLINIC | Age: 75
Discharge: HOME | End: 2025-06-23
Payer: MEDICARE

## 2025-06-23 DIAGNOSIS — E78.2 MIXED HYPERLIPIDEMIA: ICD-10-CM

## 2025-06-23 DIAGNOSIS — E04.2 MULTIPLE THYROID NODULES: Primary | ICD-10-CM

## 2025-06-23 PROCEDURE — 75571 CT HRT W/O DYE W/CA TEST: CPT

## 2025-07-07 ENCOUNTER — APPOINTMENT (OUTPATIENT)
Facility: CLINIC | Age: 75
End: 2025-07-07
Payer: MEDICARE

## 2025-07-07 VITALS
HEART RATE: 76 BPM | BODY MASS INDEX: 31.45 KG/M2 | DIASTOLIC BLOOD PRESSURE: 78 MMHG | HEIGHT: 59 IN | WEIGHT: 156 LBS | OXYGEN SATURATION: 99 % | SYSTOLIC BLOOD PRESSURE: 118 MMHG

## 2025-07-07 DIAGNOSIS — E78.2 MIXED HYPERLIPIDEMIA: Primary | ICD-10-CM

## 2025-07-07 DIAGNOSIS — I25.10 CORONARY ARTERY DISEASE DUE TO CALCIFIED CORONARY LESION: ICD-10-CM

## 2025-07-07 DIAGNOSIS — K76.0 FATTY LIVER: ICD-10-CM

## 2025-07-07 DIAGNOSIS — J32.0 CHRONIC MAXILLARY SINUSITIS: ICD-10-CM

## 2025-07-07 DIAGNOSIS — I10 BENIGN ESSENTIAL HYPERTENSION: ICD-10-CM

## 2025-07-07 DIAGNOSIS — I25.84 CORONARY ARTERY DISEASE DUE TO CALCIFIED CORONARY LESION: ICD-10-CM

## 2025-07-07 DIAGNOSIS — E01.0 THYROMEGALY: ICD-10-CM

## 2025-07-07 PROCEDURE — 3078F DIAST BP <80 MM HG: CPT

## 2025-07-07 PROCEDURE — G2211 COMPLEX E/M VISIT ADD ON: HCPCS

## 2025-07-07 PROCEDURE — 3008F BODY MASS INDEX DOCD: CPT

## 2025-07-07 PROCEDURE — 1159F MED LIST DOCD IN RCRD: CPT

## 2025-07-07 PROCEDURE — 99214 OFFICE O/P EST MOD 30 MIN: CPT

## 2025-07-07 PROCEDURE — 1036F TOBACCO NON-USER: CPT

## 2025-07-07 PROCEDURE — 3074F SYST BP LT 130 MM HG: CPT

## 2025-07-07 RX ORDER — ROSUVASTATIN CALCIUM 5 MG/1
20 TABLET, COATED ORAL DAILY
Qty: 120 TABLET | Refills: 1 | Status: SHIPPED | OUTPATIENT
Start: 2025-07-07 | End: 2025-07-10 | Stop reason: WASHOUT

## 2025-07-07 ASSESSMENT — PATIENT HEALTH QUESTIONNAIRE - PHQ9
SUM OF ALL RESPONSES TO PHQ9 QUESTIONS 1 AND 2: 0
1. LITTLE INTEREST OR PLEASURE IN DOING THINGS: NOT AT ALL
2. FEELING DOWN, DEPRESSED OR HOPELESS: NOT AT ALL

## 2025-07-07 NOTE — PROGRESS NOTES
"Chief Complaint   Patient presents with    Results     - PATIENT WOULD LIKE TO REVIEW RECENT THYROID ULTRASOUND RESULTS  - HAS ONCOLOGY APPOINTMENT SCHEDULED     Subjective     1) Thyroid nodules  - Hx of suspicious thyroid nodules found on US  - Referral to oncologist pending, f/u with surgeon scheduled end of month for possible biopsy  - Hx of Hashimoto's disease, diagnosed ~20 yr ago  - Reports recent onset of throat tenderness, concern for goiter  - No CP, SOB, palpitations, or dizziness    2) Hyperlipidemia and coronary artery disease risk  - Hx of high LDL, increasing over time  - Recent calcium score in the 400  - Family Hx: mother with CHF, sister who  of MI in her 30's  - Pt concerned about statin side effects (cognitive, balance, muscle aches)  - Hx of fatty liver, no known elevated LFTs  - On BP meds x20 yr    The 10-year ASCVD risk score (Michelle FINCH, et al., 2019) is: 16.8%    Values used to calculate the score:      Age: 74 years      Sex: Female      Is Non- : No      Diabetic: No      Tobacco smoker: No      Systolic Blood Pressure: 118 mmHg      Is BP treated: Yes      HDL Cholesterol: 50 mg/dL      Total Cholesterol: 242 mg/dL      3) Chronic sinus/dental infection  - Hx of fall 1 yr ago, required 12 teeth extracted, 5 implants  - Persistent infection left side of face, not resolved after multiple abx  - Dentist recommended ENT referral, not yet completed  - Reports ongoing sinus symptoms: congestion, sneezing, cough, difficulty breathing through nose    Social/functional  - Retired teacher, early childhood/  - Primary caregiver for 97 y/o mother and 49 y/o son with special needs  - Reports feeling overwhelmed by multiple medical issues  - Still independent with ADLs, driving, climbing stairs    Objective   /78   Pulse 76   Ht (!) 1.499 m (4' 11.02\")   Wt 70.8 kg (156 lb)   SpO2 99%   BMI 31.49 kg/m²     Physical Exam  Vitals reviewed. "   Constitutional:       Appearance: Normal appearance.   HENT:      Head: Normocephalic and atraumatic.   Eyes:      Extraocular Movements: Extraocular movements intact.      Pupils: Pupils are equal, round, and reactive to light.   Neck:      Thyroid: Thyromegaly and thyroid tenderness present.   Cardiovascular:      Rate and Rhythm: Normal rate and regular rhythm.      Heart sounds: Murmur heard.   Pulmonary:      Effort: Pulmonary effort is normal. No respiratory distress.      Breath sounds: Normal breath sounds.   Musculoskeletal:      Right lower leg: No edema.      Left lower leg: No edema.   Neurological:      Mental Status: She is alert and oriented to person, place, and time.   Psychiatric:         Mood and Affect: Mood normal.         Behavior: Behavior normal.       Assessment/Plan     1) Thyroid nodules, suspicious on US, pending oncology/surgical f/u  - F/u with oncologist and surgeon for biopsy at end of month  - Monitor for worsening symptoms (dysphagia, voice changes, rapid growth)  - Await pathology for further management    2) Hyperlipidemia, significant coronary artery plaque on CT, high ASCVD risk  - Start rosuvastatin (Crestor) 20 mg daily  - Monitor for side effects (myalgias, LFT elevation, cognitive changes)  - Recheck labs and f/u in 4-6 wk  - Cardiology referral to Dr. Miranda for further cardiac evaluation and baseline echo    3) Chronic sinus/dental infection, persistent after dental intervention and abx  - ENT referral placed for evaluation of chronic sinus/dental infection    4) Fatty liver  - Monitor LFTs with statin initiation  - Continue risk factor modification    5) Hypertension  - Continue current antihypertensive regimen    6) Psychosocial stress, caregiver burden  - Encourage self-care, prioritize medical issues  - Supportive counseling as needed    F/u in 4-6 wk for medication monitoring/CAD or sooner if new/worsening symptoms

## 2025-07-10 DIAGNOSIS — E78.2 MIXED HYPERLIPIDEMIA: Primary | ICD-10-CM

## 2025-07-10 RX ORDER — ATORVASTATIN CALCIUM 20 MG/1
20 TABLET, FILM COATED ORAL DAILY
Qty: 90 TABLET | Refills: 1 | Status: SHIPPED | OUTPATIENT
Start: 2025-07-10 | End: 2026-01-06

## 2025-07-12 LAB — NONINV COLON CA DNA+OCC BLD SCRN STL QL: NORMAL

## 2025-07-25 LAB — NONINV COLON CA DNA+OCC BLD SCRN STL QL: NEGATIVE

## 2025-07-28 ENCOUNTER — APPOINTMENT (OUTPATIENT)
Dept: SURGERY | Facility: CLINIC | Age: 75
End: 2025-07-28
Payer: MEDICARE

## 2025-07-28 VITALS
DIASTOLIC BLOOD PRESSURE: 77 MMHG | SYSTOLIC BLOOD PRESSURE: 123 MMHG | HEART RATE: 83 BPM | WEIGHT: 155.6 LBS | TEMPERATURE: 98.1 F | BODY MASS INDEX: 31.41 KG/M2

## 2025-07-28 DIAGNOSIS — E04.2 MULTINODULAR THYROID: Primary | ICD-10-CM

## 2025-07-28 PROCEDURE — 99204 OFFICE O/P NEW MOD 45 MIN: CPT | Performed by: SURGERY

## 2025-07-28 PROCEDURE — 3078F DIAST BP <80 MM HG: CPT | Performed by: SURGERY

## 2025-07-28 PROCEDURE — 1159F MED LIST DOCD IN RCRD: CPT | Performed by: SURGERY

## 2025-07-28 PROCEDURE — 3074F SYST BP LT 130 MM HG: CPT | Performed by: SURGERY

## 2025-07-28 PROCEDURE — 1125F AMNT PAIN NOTED PAIN PRSNT: CPT | Performed by: SURGERY

## 2025-07-28 PROCEDURE — 1036F TOBACCO NON-USER: CPT | Performed by: SURGERY

## 2025-07-28 RX ORDER — ACETAMINOPHEN AND IBUPROFEN 250; 125 MG/1; MG/1
TABLET, FILM COATED ORAL 2 TIMES DAILY
COMMUNITY

## 2025-07-28 RX ORDER — ROSUVASTATIN CALCIUM 20 MG/1
TABLET, COATED ORAL
COMMUNITY

## 2025-07-28 ASSESSMENT — ENCOUNTER SYMPTOMS
ENDOCRINE NEGATIVE: 1
EYES NEGATIVE: 1
RESPIRATORY NEGATIVE: 1
PSYCHIATRIC NEGATIVE: 1
CONSTITUTIONAL NEGATIVE: 1
NEUROLOGICAL NEGATIVE: 1
MUSCULOSKELETAL NEGATIVE: 1
CARDIOVASCULAR NEGATIVE: 1

## 2025-07-28 ASSESSMENT — PAIN SCALES - GENERAL: PAINLEVEL_OUTOF10: 2

## 2025-07-28 NOTE — PROGRESS NOTES
Subjective   Patient ID: Mackenzie Acosta is a 74 y.o. female who presents for surgical consultation for multinodular/multicystic thyroid gland.    HPI I saw Mrs. Acosta in surgery clinic today.  She is a pleasant 74-year-old woman who underwent a thyroid ultrasound for palpable abnormality in her thyroid.  This was completed on June 19, 2025 and the full report is listed below.  I personally reviewed the images which demonstrate a 2.7 cm solid nodule in the right thyroid lobe.  There is a cystic area in the left thyroid lobe that is almost all completely cystic with minimal solid components.    I would agree with radiology that she needs a biopsy of the right sided lesion but I am not concerned about the cystic area on the left.    She is clinically euthyroid.  Her last TSH level was almost 2 years ago in April 2023.  Therefore she would be due for updated blood work.  TSH was normal at that time of 1.11.    She denies any prior history of thyroid disease.  No neck pain.  No difficulty breathing or swallowing.  No troubles with her voice.  No personal history of head neck or chest radiation exposure.    Family history her grandmother had thyroid surgery.  This was all benign.  No thyroid cancer in the family    Review of Systems   Constitutional: Negative.    HENT: Negative.     Eyes: Negative.    Respiratory: Negative.     Cardiovascular: Negative.    Endocrine: Negative.    Musculoskeletal: Negative.    Neurological: Negative.    Psychiatric/Behavioral: Negative.         Objective   Physical Exam  Vitals reviewed.   Constitutional:       Appearance: Normal appearance.     Eyes:      Comments: No proptosis   Neck:      Vascular: No carotid bruit.      Comments: Palpable bilateral nodules.  Both move easily upon swallowing.  No localized fixation.  Trachea midline  Cardiovascular:      Rate and Rhythm: Normal rate and regular rhythm.      Heart sounds: Normal heart sounds.   Pulmonary:      Effort: Pulmonary effort  is normal. No respiratory distress.      Breath sounds: Normal breath sounds. No wheezing or rales.     Musculoskeletal:         General: Normal range of motion.     Skin:     General: Skin is warm.     Neurological:      General: No focal deficit present.      Mental Status: She is alert and oriented to person, place, and time.     Psychiatric:         Mood and Affect: Mood normal.         Behavior: Behavior normal.         US THYROID; 6/19/2025 2:07 pm      INDICATION:  Signs/Symptoms:thyroid nodule, right side most palpable. Thyromegaly.      COMPARISON:  None.      ACCESSION NUMBER(S):  [id number]      ORDERING CLINICIAN:  RETA RYAN      TECHNIQUE:  Grayscale and color Doppler imaging of the thyroid gland was  performed.      FINDINGS:  Right and left thyroid lobes are normal in size with heterogeneous  echogenicity.      Right lobe of the thyroid: 4.9 cm x 1.7 cm x 2.1 cm.  A wider than tall 2.7 cm hypoechoic solid nodule with smooth margins  is seen (TR 4: Moderately suspicious) A 0.7 cm wider than tall  smoothly marginated cyst is seen in the lower pole. (TR 1: Benign)      Left lobe of the thyroid: 4.7 cm x 1.8 cm x 2.6 cm.  A smoothly marginated 2.4 cm wider than tall cystic nodule with solid  papillary projection is seen. The papillary projection is lobulated.  The possibility of a papillary thyroid carcinoma is not excluded.  This is seen along the medial aspect of the midpole of the left  thyroid lobe. There is a 0.9 cm wider than tall smoothly marginated  mixed cystic and solid nodule (TR 2: Not suspicious)  0.9 cm wider than tall smoothly marginated mixed cystic and solid  nodule is noted in the upper pole as well (TR 2: Not suspicious)      Thyroid isthmus: 0.4 cm. Unremarkable.      IMPRESSION:  2.7 cm TR 4 nodule within right thyroid lobe for which  ultrasound-guided FNA is advised.      2.4 cm cystic nodule with solid papillary vascular component within  left thyroid lobe medially for  which ultrasound-guided FNA is also  advised.    Assessment/Plan    Mrs. Acosta has evidence of a 2.7 cm TI-RADS 4 nodule in the right thyroid lobe and a 2.4 cm cyst in her left thyroid lobe.  She has no compressive symptoms with either of these.  She is clinically and biochemically euthyroid.  There is no personal history of exposure risk for thyroid cancer and no family history of thyroid cancer.  Her grandmother did have thyroid surgery for benign disease.    Today in the office we discussed that I would recommend biopsy of the solid nodule in her right thyroid lobe but I do not think the cyst requires biopsy.  We will work on setting up a time for her to do this.  I asked her to see if she could stop her nonsteroidal anti-inflammatory medication about 1 week before the biopsy to minimize any bleeding risk.  She said that should not be a problem.    Any further decision for surgical excision versus ongoing radiographic evaluation will be based on biopsy reports.         Jordan Beck MD 07/28/25 1:49 PM

## 2025-07-28 NOTE — LETTER
July 28, 2025     Neyda Doty DO  [address] [place] [id number]    Patient: Mackenzie Acosta   YOB: 1950   Date of Visit: 7/28/2025       Dear Dr. Neyda Doty DO:    Thank you for referring Mackenzie Acosta to me for evaluation. Below are my notes for this consultation.  If you have questions, please do not hesitate to call me. I look forward to following your patient along with you.       Sincerely,     Jordan Beck MD      CC: No Recipients  ______________________________________________________________________________________    Subjective  Patient ID: Mackenzie Acosta is a 74 y.o. female who presents for surgical consultation for multinodular/multicystic thyroid gland.    HPI I saw Mrs. Acosta in surgery clinic today.  She is a pleasant 74-year-old woman who underwent a thyroid ultrasound for palpable abnormality in her thyroid.  This was completed on June 19, 2025 and the full report is listed below.  I personally reviewed the images which demonstrate a 2.7 cm solid nodule in the right thyroid lobe.  There is a cystic area in the left thyroid lobe that is almost all completely cystic with minimal solid components.    I would agree with radiology that she needs a biopsy of the right sided lesion but I am not concerned about the cystic area on the left.    She is clinically euthyroid.  Her last TSH level was almost 2 years ago in April 2023.  Therefore she would be due for updated blood work.  TSH was normal at that time of 1.11.    She denies any prior history of thyroid disease.  No neck pain.  No difficulty breathing or swallowing.  No troubles with her voice.  No personal history of head neck or chest radiation exposure.    Family history her grandmother had thyroid surgery.  This was all benign.  No thyroid cancer in the family    Review of Systems   Constitutional: Negative.    HENT: Negative.     Eyes: Negative.    Respiratory: Negative.     Cardiovascular: Negative.     Endocrine: Negative.    Musculoskeletal: Negative.    Neurological: Negative.    Psychiatric/Behavioral: Negative.         Objective  Physical Exam  Vitals reviewed.   Constitutional:       Appearance: Normal appearance.     Eyes:      Comments: No proptosis   Neck:      Vascular: No carotid bruit.      Comments: Palpable bilateral nodules.  Both move easily upon swallowing.  No localized fixation.  Trachea midline  Cardiovascular:      Rate and Rhythm: Normal rate and regular rhythm.      Heart sounds: Normal heart sounds.   Pulmonary:      Effort: Pulmonary effort is normal. No respiratory distress.      Breath sounds: Normal breath sounds. No wheezing or rales.     Musculoskeletal:         General: Normal range of motion.     Skin:     General: Skin is warm.     Neurological:      General: No focal deficit present.      Mental Status: She is alert and oriented to person, place, and time.     Psychiatric:         Mood and Affect: Mood normal.         Behavior: Behavior normal.         US THYROID; 6/19/2025 2:07 pm      INDICATION:  Signs/Symptoms:thyroid nodule, right side most palpable. Thyromegaly.      COMPARISON:  None.      ACCESSION NUMBER(S):  [id number]      ORDERING CLINICIAN:  RETA RYAN      TECHNIQUE:  Grayscale and color Doppler imaging of the thyroid gland was  performed.      FINDINGS:  Right and left thyroid lobes are normal in size with heterogeneous  echogenicity.      Right lobe of the thyroid: 4.9 cm x 1.7 cm x 2.1 cm.  A wider than tall 2.7 cm hypoechoic solid nodule with smooth margins  is seen (TR 4: Moderately suspicious) A 0.7 cm wider than tall  smoothly marginated cyst is seen in the lower pole. (TR 1: Benign)      Left lobe of the thyroid: 4.7 cm x 1.8 cm x 2.6 cm.  A smoothly marginated 2.4 cm wider than tall cystic nodule with solid  papillary projection is seen. The papillary projection is lobulated.  The possibility of a papillary thyroid carcinoma is not excluded.  This  is seen along the medial aspect of the midpole of the left  thyroid lobe. There is a 0.9 cm wider than tall smoothly marginated  mixed cystic and solid nodule (TR 2: Not suspicious)  0.9 cm wider than tall smoothly marginated mixed cystic and solid  nodule is noted in the upper pole as well (TR 2: Not suspicious)      Thyroid isthmus: 0.4 cm. Unremarkable.      IMPRESSION:  2.7 cm TR 4 nodule within right thyroid lobe for which  ultrasound-guided FNA is advised.      2.4 cm cystic nodule with solid papillary vascular component within  left thyroid lobe medially for which ultrasound-guided FNA is also  advised.    Assessment/Plan   Mrs. Acosta has evidence of a 2.7 cm TI-RADS 4 nodule in the right thyroid lobe and a 2.4 cm cyst in her left thyroid lobe.  She has no compressive symptoms with either of these.  She is clinically and biochemically euthyroid.  There is no personal history of exposure risk for thyroid cancer and no family history of thyroid cancer.  Her grandmother did have thyroid surgery for benign disease.    Today in the office we discussed that I would recommend biopsy of the solid nodule in her right thyroid lobe but I do not think the cyst requires biopsy.  We will work on setting up a time for her to do this.  I asked her to see if she could stop her nonsteroidal anti-inflammatory medication about 1 week before the biopsy to minimize any bleeding risk.  She said that should not be a problem.    Any further decision for surgical excision versus ongoing radiographic evaluation will be based on biopsy reports.         Jordan Beck MD 07/28/25 1:49 PM

## 2025-08-07 DIAGNOSIS — E78.2 MIXED HYPERLIPIDEMIA: Primary | ICD-10-CM

## 2025-08-07 RX ORDER — ROSUVASTATIN CALCIUM 20 MG/1
20 TABLET, COATED ORAL DAILY
Qty: 30 TABLET | Refills: 0 | Status: SHIPPED | OUTPATIENT
Start: 2025-08-07 | End: 2025-09-06

## 2025-08-11 ENCOUNTER — APPOINTMENT (OUTPATIENT)
Facility: CLINIC | Age: 75
End: 2025-08-11
Payer: MEDICARE

## 2025-08-11 VITALS
OXYGEN SATURATION: 96 % | BODY MASS INDEX: 29.27 KG/M2 | HEIGHT: 61 IN | DIASTOLIC BLOOD PRESSURE: 72 MMHG | WEIGHT: 155 LBS | SYSTOLIC BLOOD PRESSURE: 126 MMHG | HEART RATE: 72 BPM

## 2025-08-11 DIAGNOSIS — E04.2 MULTINODULAR THYROID: ICD-10-CM

## 2025-08-11 DIAGNOSIS — K76.0 FATTY LIVER: Primary | ICD-10-CM

## 2025-08-11 DIAGNOSIS — E78.2 MIXED HYPERLIPIDEMIA: ICD-10-CM

## 2025-08-11 ASSESSMENT — PATIENT HEALTH QUESTIONNAIRE - PHQ9
2. FEELING DOWN, DEPRESSED OR HOPELESS: NOT AT ALL
SUM OF ALL RESPONSES TO PHQ9 QUESTIONS 1 AND 2: 0
1. LITTLE INTEREST OR PLEASURE IN DOING THINGS: NOT AT ALL

## 2025-08-14 LAB
ALBUMIN SERPL-MCNC: 4.6 G/DL (ref 3.6–5.1)
ALP SERPL-CCNC: 47 U/L (ref 37–153)
ALT SERPL-CCNC: 26 U/L (ref 6–29)
ANION GAP SERPL CALCULATED.4IONS-SCNC: 9 MMOL/L (CALC) (ref 7–17)
AST SERPL-CCNC: 24 U/L (ref 10–35)
BILIRUB SERPL-MCNC: 0.6 MG/DL (ref 0.2–1.2)
BUN SERPL-MCNC: 18 MG/DL (ref 7–25)
CALCIUM SERPL-MCNC: 10.6 MG/DL (ref 8.6–10.4)
CHLORIDE SERPL-SCNC: 99 MMOL/L (ref 98–110)
CHOLEST SERPL-MCNC: 147 MG/DL
CHOLEST/HDLC SERPL: 2.8 (CALC)
CO2 SERPL-SCNC: 28 MMOL/L (ref 20–32)
CREAT SERPL-MCNC: 0.73 MG/DL (ref 0.6–1)
EGFRCR SERPLBLD CKD-EPI 2021: 86 ML/MIN/1.73M2
GLUCOSE SERPL-MCNC: 124 MG/DL (ref 65–99)
HBA1C MFR BLD: 5.7 %
HDLC SERPL-MCNC: 53 MG/DL
LDLC SERPL CALC-MCNC: 71 MG/DL (CALC)
NONHDLC SERPL-MCNC: 94 MG/DL (CALC)
POTASSIUM SERPL-SCNC: 3.9 MMOL/L (ref 3.5–5.3)
PROT SERPL-MCNC: 7 G/DL (ref 6.1–8.1)
SODIUM SERPL-SCNC: 136 MMOL/L (ref 135–146)
TRIGL SERPL-MCNC: 147 MG/DL
TSH SERPL-ACNC: 1.38 MIU/L (ref 0.4–4.5)

## 2025-08-20 ENCOUNTER — RESULTS FOLLOW-UP (OUTPATIENT)
Facility: CLINIC | Age: 75
End: 2025-08-20
Payer: MEDICARE

## 2025-08-20 DIAGNOSIS — E78.2 MIXED HYPERLIPIDEMIA: ICD-10-CM

## 2025-08-20 RX ORDER — ROSUVASTATIN CALCIUM 20 MG/1
20 TABLET, COATED ORAL DAILY
Qty: 90 TABLET | Refills: 1 | Status: SHIPPED | OUTPATIENT
Start: 2025-08-20 | End: 2026-02-16

## 2025-09-08 ENCOUNTER — APPOINTMENT (OUTPATIENT)
Dept: SURGERY | Facility: CLINIC | Age: 75
End: 2025-09-08
Payer: MEDICARE

## 2025-09-15 ENCOUNTER — APPOINTMENT (OUTPATIENT)
Facility: CLINIC | Age: 75
End: 2025-09-15
Payer: MEDICARE

## 2025-10-01 ENCOUNTER — APPOINTMENT (OUTPATIENT)
Dept: OTOLARYNGOLOGY | Facility: CLINIC | Age: 75
End: 2025-10-01
Payer: MEDICARE